# Patient Record
Sex: FEMALE | Race: WHITE | NOT HISPANIC OR LATINO | Employment: OTHER | URBAN - METROPOLITAN AREA
[De-identification: names, ages, dates, MRNs, and addresses within clinical notes are randomized per-mention and may not be internally consistent; named-entity substitution may affect disease eponyms.]

---

## 2017-12-14 ENCOUNTER — OUTSIDE FACILITY SERVICE (OUTPATIENT)
Dept: GASTROENTEROLOGY | Facility: CLINIC | Age: 52
End: 2017-12-14

## 2017-12-14 ENCOUNTER — LAB REQUISITION (OUTPATIENT)
Dept: LAB | Facility: HOSPITAL | Age: 52
End: 2017-12-14

## 2017-12-14 DIAGNOSIS — D12.5 BENIGN NEOPLASM OF SIGMOID COLON: ICD-10-CM

## 2017-12-14 PROCEDURE — 88305 TISSUE EXAM BY PATHOLOGIST: CPT | Performed by: INTERNAL MEDICINE

## 2017-12-14 PROCEDURE — 45385 COLONOSCOPY W/LESION REMOVAL: CPT | Performed by: INTERNAL MEDICINE

## 2017-12-14 PROCEDURE — G0500 MOD SEDAT ENDO SERVICE >5YRS: HCPCS | Performed by: INTERNAL MEDICINE

## 2017-12-15 LAB
CYTO UR: NORMAL
LAB AP CASE REPORT: NORMAL
LAB AP CLINICAL INFORMATION: NORMAL
Lab: NORMAL
PATH REPORT.FINAL DX SPEC: NORMAL
PATH REPORT.GROSS SPEC: NORMAL

## 2020-01-02 ENCOUNTER — TELEPHONE (OUTPATIENT)
Dept: INTERNAL MEDICINE | Facility: CLINIC | Age: 55
End: 2020-01-02

## 2020-01-02 NOTE — TELEPHONE ENCOUNTER
Pt says she was a long time pt and ended up getting  and had to go on medicaid.  Pt says office didn't take medicaid so she had to find care somewhere.  Now that office takes medicaid pt would like to see Dr. Escobedo again.  Pt says she would have never left if it wasn't for the insurance change.  Pt is requesting to be seen again by Dr. Escobedo and would like a call back if she can or not.

## 2020-01-06 PROBLEM — E66.9 OBESITY, CLASS I, BMI 30-34.9: Status: ACTIVE | Noted: 2020-01-06

## 2020-01-06 PROBLEM — E53.8 VITAMIN B12 DEFICIENCY (NON ANEMIC): Status: ACTIVE | Noted: 2020-01-06

## 2020-01-06 PROBLEM — M19.041 PRIMARY OSTEOARTHRITIS OF BOTH HANDS: Status: ACTIVE | Noted: 2020-01-06

## 2020-01-06 PROBLEM — M54.2 CHRONIC NECK PAIN: Status: ACTIVE | Noted: 2020-01-06

## 2020-01-06 PROBLEM — K21.00 GASTRO-ESOPHAGEAL REFLUX DISEASE WITH ESOPHAGITIS: Status: ACTIVE | Noted: 2020-01-06

## 2020-01-06 PROBLEM — M19.042 PRIMARY OSTEOARTHRITIS OF BOTH HANDS: Status: ACTIVE | Noted: 2020-01-06

## 2020-01-06 PROBLEM — F41.0 PANIC DISORDER WITHOUT AGORAPHOBIA: Status: ACTIVE | Noted: 2020-01-06

## 2020-01-06 PROBLEM — R73.09 ABNORMAL GLUCOSE: Status: ACTIVE | Noted: 2020-01-06

## 2020-01-06 PROBLEM — N95.9 MENOPAUSAL AND POSTMENOPAUSAL DISORDER: Status: ACTIVE | Noted: 2020-01-06

## 2020-01-06 PROBLEM — G89.29 CHRONIC NECK PAIN: Status: ACTIVE | Noted: 2020-01-06

## 2020-01-06 PROBLEM — E55.9 VITAMIN D DEFICIENCY: Status: ACTIVE | Noted: 2020-01-06

## 2020-01-06 PROBLEM — Z13.89 ENCOUNTER FOR SCREENING FOR OTHER DISORDER: Status: ACTIVE | Noted: 2020-01-06

## 2020-01-06 PROBLEM — Z86.39 HISTORY OF THYROTOXICOSIS: Status: ACTIVE | Noted: 2020-01-06

## 2020-01-06 PROBLEM — E78.00 HYPERCHOLESTEROLEMIA: Status: ACTIVE | Noted: 2020-01-06

## 2020-01-06 PROBLEM — E66.811 OBESITY, CLASS I, BMI 30-34.9: Status: ACTIVE | Noted: 2020-01-06

## 2020-01-06 PROBLEM — Z78.0 MENOPAUSE: Status: ACTIVE | Noted: 2020-01-06

## 2020-01-16 ENCOUNTER — OFFICE VISIT (OUTPATIENT)
Dept: INTERNAL MEDICINE | Facility: CLINIC | Age: 55
End: 2020-01-16

## 2020-01-16 ENCOUNTER — LAB (OUTPATIENT)
Dept: LAB | Facility: HOSPITAL | Age: 55
End: 2020-01-16

## 2020-01-16 VITALS
HEIGHT: 62 IN | DIASTOLIC BLOOD PRESSURE: 80 MMHG | HEART RATE: 76 BPM | BODY MASS INDEX: 36.07 KG/M2 | SYSTOLIC BLOOD PRESSURE: 126 MMHG | WEIGHT: 196 LBS

## 2020-01-16 DIAGNOSIS — R73.09 ABNORMAL GLUCOSE: ICD-10-CM

## 2020-01-16 DIAGNOSIS — E55.9 VITAMIN D DEFICIENCY: ICD-10-CM

## 2020-01-16 DIAGNOSIS — E78.00 HYPERCHOLESTEROLEMIA: ICD-10-CM

## 2020-01-16 DIAGNOSIS — K21.00 GASTRO-ESOPHAGEAL REFLUX DISEASE WITH ESOPHAGITIS: ICD-10-CM

## 2020-01-16 DIAGNOSIS — E53.8 VITAMIN B12 DEFICIENCY (NON ANEMIC): ICD-10-CM

## 2020-01-16 DIAGNOSIS — Z23 NEED FOR VACCINATION: ICD-10-CM

## 2020-01-16 DIAGNOSIS — E66.01 CLASS 2 SEVERE OBESITY DUE TO EXCESS CALORIES WITH SERIOUS COMORBIDITY AND BODY MASS INDEX (BMI) OF 36.0 TO 36.9 IN ADULT (HCC): Chronic | ICD-10-CM

## 2020-01-16 DIAGNOSIS — E78.00 HYPERCHOLESTEROLEMIA: Primary | ICD-10-CM

## 2020-01-16 DIAGNOSIS — L65.9 HAIR LOSS: Chronic | ICD-10-CM

## 2020-01-16 DIAGNOSIS — R10.31 RLQ ABDOMINAL PAIN: Chronic | ICD-10-CM

## 2020-01-16 PROBLEM — E66.812 CLASS 2 SEVERE OBESITY DUE TO EXCESS CALORIES WITH SERIOUS COMORBIDITY AND BODY MASS INDEX (BMI) OF 36.0 TO 36.9 IN ADULT: Chronic | Status: ACTIVE | Noted: 2020-01-06

## 2020-01-16 PROBLEM — F41.0 PANIC DISORDER WITHOUT AGORAPHOBIA: Status: RESOLVED | Noted: 2020-01-06 | Resolved: 2020-01-16

## 2020-01-16 LAB
BILIRUB UR QL STRIP: NEGATIVE
CLARITY UR: CLEAR
COLOR UR: YELLOW
GLUCOSE UR STRIP-MCNC: NEGATIVE MG/DL
HGB UR QL STRIP.AUTO: NEGATIVE
KETONES UR QL STRIP: NEGATIVE
LEUKOCYTE ESTERASE UR QL STRIP.AUTO: NEGATIVE
NITRITE UR QL STRIP: NEGATIVE
PH UR STRIP.AUTO: 6.5 [PH] (ref 5–8)
PROT UR QL STRIP: NEGATIVE
SP GR UR STRIP: 1.02 (ref 1–1.03)
UROBILINOGEN UR QL STRIP: NORMAL

## 2020-01-16 PROCEDURE — 83036 HEMOGLOBIN GLYCOSYLATED A1C: CPT

## 2020-01-16 PROCEDURE — 81003 URINALYSIS AUTO W/O SCOPE: CPT

## 2020-01-16 PROCEDURE — 36415 COLL VENOUS BLD VENIPUNCTURE: CPT

## 2020-01-16 PROCEDURE — 80061 LIPID PANEL: CPT

## 2020-01-16 PROCEDURE — 86376 MICROSOMAL ANTIBODY EACH: CPT

## 2020-01-16 PROCEDURE — 93000 ELECTROCARDIOGRAM COMPLETE: CPT | Performed by: INTERNAL MEDICINE

## 2020-01-16 PROCEDURE — 85025 COMPLETE CBC W/AUTO DIFF WBC: CPT

## 2020-01-16 PROCEDURE — 90472 IMMUNIZATION ADMIN EACH ADD: CPT | Performed by: INTERNAL MEDICINE

## 2020-01-16 PROCEDURE — 90674 CCIIV4 VAC NO PRSV 0.5 ML IM: CPT | Performed by: INTERNAL MEDICINE

## 2020-01-16 PROCEDURE — 84439 ASSAY OF FREE THYROXINE: CPT

## 2020-01-16 PROCEDURE — 90471 IMMUNIZATION ADMIN: CPT | Performed by: INTERNAL MEDICINE

## 2020-01-16 PROCEDURE — 99213 OFFICE O/P EST LOW 20 MIN: CPT | Performed by: INTERNAL MEDICINE

## 2020-01-16 PROCEDURE — 82306 VITAMIN D 25 HYDROXY: CPT

## 2020-01-16 PROCEDURE — 84443 ASSAY THYROID STIM HORMONE: CPT

## 2020-01-16 PROCEDURE — 90632 HEPA VACCINE ADULT IM: CPT | Performed by: INTERNAL MEDICINE

## 2020-01-16 PROCEDURE — 99396 PREV VISIT EST AGE 40-64: CPT | Performed by: INTERNAL MEDICINE

## 2020-01-16 PROCEDURE — 82607 VITAMIN B-12: CPT

## 2020-01-16 PROCEDURE — 80053 COMPREHEN METABOLIC PANEL: CPT

## 2020-01-16 RX ORDER — RANITIDINE 150 MG/1
150 TABLET ORAL NIGHTLY
Qty: 90 TABLET | Refills: 1 | Status: SHIPPED | OUTPATIENT
Start: 2020-01-16 | End: 2020-04-16

## 2020-01-16 RX ORDER — LACTOBACILLUS RHAMNOSUS GG 10B CELL
1 CAPSULE ORAL DAILY
COMMUNITY

## 2020-01-16 RX ORDER — ESOMEPRAZOLE MAGNESIUM 40 MG/1
40 CAPSULE, DELAYED RELEASE ORAL
Qty: 90 CAPSULE | Refills: 1 | Status: SHIPPED | OUTPATIENT
Start: 2020-01-16 | End: 2020-03-31

## 2020-01-16 NOTE — PATIENT INSTRUCTIONS
Exercising to Lose Weight  Exercise is structured, repetitive physical activity to improve fitness and health. Getting regular exercise is important for everyone. It is especially important if you are overweight. Being overweight increases your risk of heart disease, stroke, diabetes, high blood pressure, and several types of cancer. Reducing your calorie intake and exercising can help you lose weight.  Exercise is usually categorized as moderate or vigorous intensity. To lose weight, most people need to do a certain amount of moderate-intensity or vigorous-intensity exercise each week.  Moderate-intensity exercise    Moderate-intensity exercise is any activity that gets you moving enough to burn at least three times more energy (calories) than if you were sitting.  Examples of moderate exercise include:  · Walking a mile in 15 minutes.  · Doing light yard work.  · Biking at an easy pace.  Most people should get at least 150 minutes (2 hours and 30 minutes) a week of moderate-intensity exercise to maintain their body weight.  Vigorous-intensity exercise  Vigorous-intensity exercise is any activity that gets you moving enough to burn at least six times more calories than if you were sitting. When you exercise at this intensity, you should be working hard enough that you are not able to carry on a conversation.  Examples of vigorous exercise include:  · Running.  · Playing a team sport, such as football, basketball, and soccer.  · Jumping rope.  Most people should get at least 75 minutes (1 hour and 15 minutes) a week of vigorous-intensity exercise to maintain their body weight.  How can exercise affect me?  When you exercise enough to burn more calories than you eat, you lose weight. Exercise also reduces body fat and builds muscle. The more muscle you have, the more calories you burn. Exercise also:  · Improves mood.  · Reduces stress and tension.  · Improves your overall fitness, flexibility, and  endurance.  · Increases bone strength.  The amount of exercise you need to lose weight depends on:  · Your age.  · The type of exercise.  · Any health conditions you have.  · Your overall physical ability.  Talk to your health care provider about how much exercise you need and what types of activities are safe for you.  What actions can I take to lose weight?  Nutrition    · Make changes to your diet as told by your health care provider or diet and nutrition specialist (dietitian). This may include:  ? Eating fewer calories.  ? Eating more protein.  ? Eating less unhealthy fats.  ? Eating a diet that includes fresh fruits and vegetables, whole grains, low-fat dairy products, and lean protein.  ? Avoiding foods with added fat, salt, and sugar.  · Drink plenty of water while you exercise to prevent dehydration or heat stroke.  Activity  · Choose an activity that you enjoy and set realistic goals. Your health care provider can help you make an exercise plan that works for you.  · Exercise at a moderate or vigorous intensity most days of the week.  ? The intensity of exercise may vary from person to person. You can tell how intense a workout is for you by paying attention to your breathing and heartbeat. Most people will notice their breathing and heartbeat get faster with more intense exercise.  · Do resistance training twice each week, such as:  ? Push-ups.  ? Sit-ups.  ? Lifting weights.  ? Using resistance bands.  · Getting short amounts of exercise can be just as helpful as long structured periods of exercise. If you have trouble finding time to exercise, try to include exercise in your daily routine.  ? Get up, stretch, and walk around every 30 minutes throughout the day.  ? Go for a walk during your lunch break.  ? Park your car farther away from your destination.  ? If you take public transportation, get off one stop early and walk the rest of the way.  ? Make phone calls while standing up and walking  around.  ? Take the stairs instead of elevators or escalators.  · Wear comfortable clothes and shoes with good support.  · Do not exercise so much that you hurt yourself, feel dizzy, or get very short of breath.  Where to find more information  · [place] Department of Health and Human Services: www.hhs.gov  · Centers for Disease Control and Prevention (CDC): www.cdc.gov  Contact a health care provider:  · Before starting a new exercise program.  · If you have questions or concerns about your weight.  · If you have a medical problem that keeps you from exercising.  Get help right away if you have any of the following while exercising:  · Injury.  · Dizziness.  · Difficulty breathing or shortness of breath that does not go away when you stop exercising.  · Chest pain.  · Rapid heartbeat.  Summary  · Being overweight increases your risk of heart disease, stroke, diabetes, high blood pressure, and several types of cancer.  · Losing weight happens when you burn more calories than you eat.  · Reducing the amount of calories you eat in addition to getting regular moderate or vigorous exercise each week helps you lose weight.  This information is not intended to replace advice given to you by your health care provider. Make sure you discuss any questions you have with your health care provider.  Document Released: 01/20/2012 Document Revised: 12/31/2018 Document Reviewed: 12/31/2018  [employer] Interactive Patient Education © 2019 [employer]    Calorie Counting for Weight Loss  Calories are units of energy. Your body needs a certain amount of calories from food to keep you going throughout the day. When you eat more calories than your body needs, your body stores the extra calories as fat. When you eat fewer calories than your body needs, your body burns fat to get the energy it needs.  Calorie counting means keeping track of how many calories you eat and drink each day. Calorie counting can be helpful if you need to lose  weight. If you make sure to eat fewer calories than your body needs, you should lose weight. Ask your health care provider what a healthy weight is for you.  For calorie counting to work, you will need to eat the right number of calories in a day in order to lose a healthy amount of weight per week. A dietitian can help you determine how many calories you need in a day and will give you suggestions on how to reach your calorie goal.  · A healthy amount of weight to lose per week is usually 1-2 lb (0.5-0.9 kg). This usually means that your daily calorie intake should be reduced by 500-750 calories.  · Eating 1,200 - 1,500 calories per day can help most women lose weight.  · Eating 1,500 - 1,800 calories per day can help most men lose weight.  What is my plan?  My goal is to have __________ calories per day.  If I have this many calories per day, I should lose around __________ pounds per week.  What do I need to know about calorie counting?  In order to meet your daily calorie goal, you will need to:  · Find out how many calories are in each food you would like to eat. Try to do this before you eat.  · Decide how much of the food you plan to eat.  · Write down what you ate and how many calories it had. Doing this is called keeping a food log.  To successfully lose weight, it is important to balance calorie counting with a healthy lifestyle that includes regular activity. Aim for 150 minutes of moderate exercise (such as walking) or 75 minutes of vigorous exercise (such as running) each week.  Where do I find calorie information?    The number of calories in a food can be found on a Nutrition Facts label. If a food does not have a Nutrition Facts label, try to look up the calories online or ask your dietitian for help.  Remember that calories are listed per serving. If you choose to have more than one serving of a food, you will have to multiply the calories per serving by the amount of servings you plan to eat. For  "example, the label on a package of bread might say that a serving size is 1 slice and that there are 90 calories in a serving. If you eat 1 slice, you will have eaten 90 calories. If you eat 2 slices, you will have eaten 180 calories.  How do I keep a food log?  Immediately after each meal, record the following information in your food log:  · What you ate. Don't forget to include toppings, sauces, and other extras on the food.  · How much you ate. This can be measured in cups, ounces, or number of items.  · How many calories each food and drink had.  · The total number of calories in the meal.  Keep your food log near you, such as in a small notebook in your pocket, or use a mobile chela or website. Some programs will calculate calories for you and show you how many calories you have left for the day to meet your goal.  What are some calorie counting tips?    · Use your calories on foods and drinks that will fill you up and not leave you hungry:  ? Some examples of foods that fill you up are nuts and nut butters, vegetables, lean proteins, and high-fiber foods like whole grains. High-fiber foods are foods with more than 5 g fiber per serving.  ? Drinks such as sodas, specialty coffee drinks, alcohol, and juices have a lot of calories, yet do not fill you up.  · Eat nutritious foods and avoid empty calories. Empty calories are calories you get from foods or beverages that do not have many vitamins or protein, such as candy, sweets, and soda. It is better to have a nutritious high-calorie food (such as an avocado) than a food with few nutrients (such as a bag of chips).  · Know how many calories are in the foods you eat most often. This will help you calculate calorie counts faster.  · Pay attention to calories in drinks. Low-calorie drinks include water and unsweetened drinks.  · Pay attention to nutrition labels for \"low fat\" or \"fat free\" foods. These foods sometimes have the same amount of calories or more calories " than the full fat versions. They also often have added sugar, starch, or salt, to make up for flavor that was removed with the fat.  · Find a way of tracking calories that works for you. Get creative. Try different apps or programs if writing down calories does not work for you.  What are some portion control tips?  · Know how many calories are in a serving. This will help you know how many servings of a certain food you can have.  · Use a measuring cup to measure serving sizes. You could also try weighing out portions on a kitchen scale. With time, you will be able to estimate serving sizes for some foods.  · Take some time to put servings of different foods on your favorite plates, bowls, and cups so you know what a serving looks like.  · Try not to eat straight from a bag or box. Doing this can lead to overeating. Put the amount you would like to eat in a cup or on a plate to make sure you are eating the right portion.  · Use smaller plates, glasses, and bowls to prevent overeating.  · Try not to multitask (for example, watch TV or use your computer) while eating. If it is time to eat, sit down at a table and enjoy your food. This will help you to know when you are full. It will also help you to be aware of what you are eating and how much you are eating.  What are tips for following this plan?  Reading food labels  · Check the calorie count compared to the serving size. The serving size may be smaller than what you are used to eating.  · Check the source of the calories. Make sure the food you are eating is high in vitamins and protein and low in saturated and trans fats.  Shopping  · Read nutrition labels while you shop. This will help you make healthy decisions before you decide to purchase your food.  · Make a grocery list and stick to it.  Cooking  · Try to cook your favorite foods in a healthier way. For example, try baking instead of frying.  · Use low-fat dairy products.  Meal planning  · Use more fruits  "and vegetables. Half of your plate should be fruits and vegetables.  · Include lean proteins like poultry and fish.  How do I count calories when eating out?  · Ask for smaller portion sizes.  · Consider sharing an entree and sides instead of getting your own entree.  · If you get your own entree, eat only half. Ask for a box at the beginning of your meal and put the rest of your entree in it so you are not tempted to eat it.  · If calories are listed on the menu, choose the lower calorie options.  · Choose dishes that include vegetables, fruits, whole grains, low-fat dairy products, and lean protein.  · Choose items that are boiled, broiled, grilled, or steamed. Stay away from items that are buttered, battered, fried, or served with cream sauce. Items labeled \"crispy\" are usually fried, unless stated otherwise.  · Choose water, low-fat milk, unsweetened iced tea, or other drinks without added sugar. If you want an alcoholic beverage, choose a lower calorie option such as a glass of wine or light beer.  · Ask for dressings, sauces, and syrups on the side. These are usually high in calories, so you should limit the amount you eat.  · If you want a salad, choose a garden salad and ask for grilled meats. Avoid extra toppings like bajwa, cheese, or fried items. Ask for the dressing on the side, or ask for olive oil and vinegar or lemon to use as dressing.  · Estimate how many servings of a food you are given. For example, a serving of cooked rice is ½ cup or about the size of half a baseball. Knowing serving sizes will help you be aware of how much food you are eating at restaurants. The list below tells you how big or small some common portion sizes are based on everyday objects:  ? 1 oz--4 stacked dice.  ? 3 oz--1 deck of cards.  ? 1 tsp--1 die.  ? 1 Tbsp--½ a ping-pong ball.  ? 2 Tbsp--1 ping-pong ball.  ? ½ cup--½ baseball.  ? 1 cup--1 baseball.  Summary  · Calorie counting means keeping track of how many calories " you eat and drink each day. If you eat fewer calories than your body needs, you should lose weight.  · A healthy amount of weight to lose per week is usually 1-2 lb (0.5-0.9 kg). This usually means reducing your daily calorie intake by 500-750 calories.  · The number of calories in a food can be found on a Nutrition Facts label. If a food does not have a Nutrition Facts label, try to look up the calories online or ask your dietitian for help.  · Use your calories on foods and drinks that will fill you up, and not on foods and drinks that will leave you hungry.  · Use smaller plates, glasses, and bowls to prevent overeating.  This information is not intended to replace advice given to you by your health care provider. Make sure you discuss any questions you have with your health care provider.  Document Released: 12/18/2006 Document Revised: 09/06/2019 Document Reviewed: 11/17/2017  [employer] Interactive Patient Education © 2019 [employer]

## 2020-01-16 NOTE — PROGRESS NOTES
QUICK REFERENCE INFORMATION:  The ABCs of the Annual Wellness Visit    Annual Wellness visit    HEALTH RISK ASSESSMENT    1965    Recent History  No hospitalization(s) within the last year..        Current Medical Providers:  Patient Care Team:  Elyse Escobedo MD as PCP - General (Internal Medicine)        Smoking Status:  Social History     Tobacco Use   Smoking Status Never Smoker   Smokeless Tobacco Never Used   Tobacco Comment    no passive smoke exposure       Alcohol Consumption:  Social History     Substance and Sexual Activity   Alcohol Use No       Depression Screen:   PHQ-2/PHQ-9 Depression Screening 1/16/2020   Little interest or pleasure in doing things 0   Feeling down, depressed, or hopeless 0   Total Score 0       Health Habits:              Recent Lab Results:  CMP:  Lab Results   Component Value Date    BUN 21 12/21/2016    CREATININE 0.90 12/21/2016    EGFRIFNONA 66 12/21/2016    BCR 23.3 12/21/2016     12/21/2016    K 3.8 12/21/2016    CO2 24.0 12/21/2016    CALCIUM 9.9 12/21/2016    ALBUMIN 5.00 (H) 12/21/2016    BILITOT 1.0 12/21/2016    ALKPHOS 141 (H) 12/21/2016    AST 19 12/21/2016    ALT 24 12/21/2016     Lipid Panel:     HbA1c:           Age-appropriate Screening Schedule:  Refer to the list below for future screening recommendations based on patient's age, sex and/or medical conditions. Orders for these recommended tests are listed in the plan section. The patient has been provided with a written plan.    Age appropriate preventive counseling done including age appropriate vaccines,regular  Mammogram and self breast exam, pap smear, colonoscopy, regular dental visits, mental health, injury prevention such as wearing seat belt and preventing falls, healthy  nutrition, healthy weight, regular physical exercise. Alcohol use is moderate.  Tobacco history-none. Drug use-none.  STD's-not at risk.          Health Maintenance   Topic Date Due   • MAMMOGRAM  1965   • ZOSTER  VACCINE (1 of 2) 09/16/2016   • INFLUENZA VACCINE  08/01/2019   • LIPID PANEL  01/16/2020   • COLONOSCOPY  12/20/2022   • TDAP/TD VACCINES (2 - Td) 05/15/2023        Subjective   History of Present Illness    Carla Camarillo is a 54 y.o. female who presents for an Annual Wellness Visit and RLQ abdominal pain and chronic conditions including GERD, hypercholesterolemia..    HPI  RLQ abdominal pain about 2 months. No nausea or fever or chills. Was constipated at the time when went to ED in [place]. Denies constipation now.They indicated she might have some ?stricture of the colon per patient. ED doc told her to start a probiotic and benefiber.  Last colonoscopy was 12/2017 by Dr. Pandya and showed 1 polyp and some diverticulosis.    HPI  Hair loss over last few months. In the part area. Has lost hair in the past when stressed. No itching or irritation of scalp. She does admit a lot of stress.    CHRONIC CONDITIONS    She has not been exercising.  She realizes she needs to improve her diet.    She has not been taking B12 or vitamin D.    GERD is ongoing. Nexium helps. Ranitidine in the past at night helped. Stopped it when the over-the-counter ranitidine was recalled.    The following portions of the patient's history were reviewed and updated as appropriate: allergies, current medications, past family history, past medical history, past social history, past surgical history and problem list.    Outpatient Medications Prior to Visit   Medication Sig Dispense Refill   • probiotic (CULTURELLE) capsule capsule Take 1 capsule by mouth Daily.     • esomeprazole (nexIUM) 40 MG capsule Take 40 mg by mouth Every Morning Before Breakfast.     • ondansetron ODT (ZOFRAN-ODT) 4 MG disintegrating tablet Take 1 tablet by mouth Every 6 (Six) Hours As Needed for nausea or vomiting for up to 10 doses. 10 tablet 0   • raNITIdine (ZANTAC) 150 MG tablet Take 150 mg by mouth 2 (Two) Times a Day.       No facility-administered medications  prior to visit.        Patient Active Problem List   Diagnosis   • Menopausal and postmenopausal disorder   • Encounter for screening for other disorder   • Vitamin D deficiency   • Class 2 severe obesity due to excess calories with serious comorbidity and body mass index (BMI) of 36.0 to 36.9 in adult (CMS/HCC)   • Abnormal glucose   • Menopause   • Hypercholesterolemia   • Primary osteoarthritis of both hands   • Gastro-esophageal reflux disease with esophagitis   • History of thyrotoxicosis   • Vitamin B12 deficiency (non anemic)   • Chronic neck pain   • RLQ abdominal pain   • Hair loss       Advance Care Planning:  Patient has an advance directive - a copy has not been provided. Have asked the patient to send this to us to add to record    Identification of Risk Factors:  Risk factors include: Cardiovascular risk  Obesity/Overweight .    Review of Systems   Constitutional: Negative for chills, fatigue and fever.   HENT: Negative for congestion, ear pain, hearing loss and sinus pressure.    Eyes: Negative for visual disturbance.   Respiratory: Negative for cough, chest tightness, shortness of breath and wheezing.    Cardiovascular: Negative for chest pain, palpitations and leg swelling.   Gastrointestinal: Positive for abdominal pain. Negative for blood in stool, constipation, diarrhea and nausea.   Endocrine: Negative for cold intolerance and heat intolerance.   Genitourinary: Negative for dysuria and frequency.   Musculoskeletal: Negative for arthralgias, back pain and gait problem.   Skin: Negative for color change and rash.   Allergic/Immunologic: Negative for environmental allergies.   Neurological: Negative for dizziness, speech difficulty and headaches.   Hematological: Negative for adenopathy. Does not bruise/bleed easily.   Psychiatric/Behavioral: Negative for confusion, dysphoric mood, sleep disturbance and suicidal ideas. The patient is not nervous/anxious.        Compared to one year ago, the patient  feels her physical health is the same.  Compared to one year ago, the patient feels her mental health is the same.    Objective     Physical Exam   Constitutional: She is oriented to person, place, and time. She appears well-developed and well-nourished.   Morbid obesity   HENT:   Head: Normocephalic.   Eyes: Pupils are equal, round, and reactive to light. Conjunctivae and EOM are normal.   Neck: Normal range of motion. Neck supple. No thyromegaly present.   Cardiovascular: Normal rate, regular rhythm, normal heart sounds and intact distal pulses.   Pulmonary/Chest: Effort normal and breath sounds normal. She has no wheezes. Right breast exhibits no inverted nipple, no mass, no nipple discharge, no skin change and no tenderness. Left breast exhibits no inverted nipple, no mass, no nipple discharge, no skin change and no tenderness.   Abdominal: Soft. Bowel sounds are normal. There is no hepatosplenomegaly. There is no tenderness. No hernia.   Musculoskeletal: Normal range of motion. She exhibits no edema or tenderness.   Lymphadenopathy:     She has no cervical adenopathy.     She has no axillary adenopathy.   Neurological: She is alert and oriented to person, place, and time. She has normal strength. No cranial nerve deficit or sensory deficit. Coordination and gait normal.   Skin: Skin is warm and dry. No rash noted.   Psychiatric: She has a normal mood and affect. Her speech is normal and behavior is normal. Judgment and thought content normal. Cognition and memory are normal.   Nursing note and vitals reviewed.          ECG 12 Lead  Date/Time: 1/16/2020 2:13 PM  Performed by: Elyse Escobedo MD  Authorized by: Elyse Escobedo MD   Comparison: compared with previous ECG   Similar to previous ECG  Rhythm: sinus rhythm  Rate: normal  BPM: 76  Conduction: conduction normal  ST Segments: ST segments normal  T Waves: T waves normal  QRS axis: normal    Clinical impression: normal ECG            Vitals:     "01/16/20 1159   BP: 126/80   BP Location: Left arm   Patient Position: Sitting   Cuff Size: Adult   Pulse: 76   Weight: 88.9 kg (196 lb)   Height: 156.2 cm (61.5\")   PainSc:   2   PainLoc: Abdomen       Patient's Body mass index is 36.43 kg/m². BMI is above normal parameters. Recommendations include: educational material, exercise counseling and nutrition counseling.      CMP:  Lab Results   Component Value Date    BUN 21 12/21/2016    CREATININE 0.90 12/21/2016    EGFRIFNONA 66 12/21/2016    BCR 23.3 12/21/2016     12/21/2016    K 3.8 12/21/2016    CO2 24.0 12/21/2016    CALCIUM 9.9 12/21/2016    ALBUMIN 5.00 (H) 12/21/2016    BILITOT 1.0 12/21/2016    ALKPHOS 141 (H) 12/21/2016    AST 19 12/21/2016    ALT 24 12/21/2016     HbA1c:  No results found for: HGBA1C  Microalbumin:  No results found for: MICROALBUR, POCMALB, POCCREAT  Lipid Panel  Lab Results   Component Value Date    AST 19 12/21/2016    ALT 24 12/21/2016       Assessment/Plan   Patient Self-Management and Personalized Health Advice  The patient has been provided with information about: diet, exercise and weight management and preventive services including:   · Annual Wellness Visit (AWV).  Patient Instructions   Problem List Items Addressed This Visit        Cardiovascular and Mediastinum    Hypercholesterolemia - Primary    Overview     1/16/2020 Elyse Escobedo MD    Decrease fats and sugars in the diet.  Increase exercise and physical activity.  Work on weight loss.         Relevant Orders    Comprehensive Metabolic Panel    CBC & Differential    Lipid Panel    Urinalysis without microscopic (no culture) - Urine, Clean Catch    TSH    T4, Free    Thyroid Peroxidase Antibody       Digestive    Class 2 severe obesity due to excess calories with serious comorbidity and body mass index (BMI) of 36.0 to 36.9 in adult (CMS/HCC) (Chronic)    Overview     1/16/2020 Elyse Escobedo MD    Increase exercise and physical activity.  Sign up for exercise " classes, walk, use hand weights at home.    Make better food choices.  Avoid snacking.  Avoid sodas.  Eat smaller portions at mealtime.    Weigh once a week on Monday mornings to monitor progress.  Try to lose about 4 pounds a month.         Vitamin D deficiency    Overview     1/16/2020 Elyse Escobedo MD    Check blood level today.         Relevant Orders    Vitamin D 25 Hydroxy    Gastro-esophageal reflux disease with esophagitis    Overview     1/16/2020 Elyse Escobedo MD    Continue Nexium daily and resume ranitidine in the evening.  Continue to avoid eating close to bedtime and avoid large meals.  Continue sleeping with the head of the bed elevated.  Weight loss often helps.         Relevant Medications    raNITIdine (ZANTAC) 150 MG tablet    esomeprazole (nexIUM) 40 MG capsule    Vitamin B12 deficiency (non anemic)    Overview     1/16/2020 Elyse Escobedo MD    Check blood level today.         Relevant Orders    Vitamin B12       Nervous and Auditory    RLQ abdominal pain (Chronic)    Overview     1/16/2020 Elyse Escobedo MD    Continue taking the probiotic and the Benefiber daily.  Drink plenty of fluids.  Eat smaller portions at mealtime.  Work on weight loss.    We will obtain the ER records and CT scan report from K from [facility] in [place].            Musculoskeletal and Integument    Hair loss (Chronic)    Overview     1/16/2020 Elyse Escobedo MD    May take a vitamin for hair and nails.  Continue working on stress reduction.  We will check labs today.            Other    Abnormal glucose    Overview     1/16/2020 Elyse Escobedo MD    Continue to decrease sugars and white carbohydrates in the diet.  Increase exercise and physical activity.  Work on weight loss.         Relevant Orders    Hemoglobin A1c      Other Visit Diagnoses     Need for vaccination        Relevant Orders    Hepatitis A Vaccine Adult IM (Completed)             Diagnosis Plan   1. Hypercholesterolemia   Comprehensive Metabolic Panel    CBC & Differential    Lipid Panel    Urinalysis without microscopic (no culture) - Urine, Clean Catch    TSH    T4, Free    Thyroid Peroxidase Antibody   2. Gastro-esophageal reflux disease with esophagitis     3. Vitamin D deficiency  Vitamin D 25 Hydroxy   4. Vitamin B12 deficiency (non anemic)  Vitamin B12   5. Class 2 severe obesity due to excess calories with serious comorbidity and body mass index (BMI) of 36.0 to 36.9 in adult (CMS/[facility])     6. Abnormal glucose  Hemoglobin A1c   7. Need for vaccination  Hepatitis A Vaccine Adult IM   8. RLQ abdominal pain     9. Hair loss         Patient Instructions   Exercising to Lose Weight  Exercise is structured, repetitive physical activity to improve fitness and health. Getting regular exercise is important for everyone. It is especially important if you are overweight. Being overweight increases your risk of heart disease, stroke, diabetes, high blood pressure, and several types of cancer. Reducing your calorie intake and exercising can help you lose weight.  Exercise is usually categorized as moderate or vigorous intensity. To lose weight, most people need to do a certain amount of moderate-intensity or vigorous-intensity exercise each week.  Moderate-intensity exercise    Moderate-intensity exercise is any activity that gets you moving enough to burn at least three times more energy (calories) than if you were sitting.  Examples of moderate exercise include:  · Walking a mile in 15 minutes.  · Doing light yard work.  · Biking at an easy pace.  Most people should get at least 150 minutes (2 hours and 30 minutes) a week of moderate-intensity exercise to maintain their body weight.  Vigorous-intensity exercise  Vigorous-intensity exercise is any activity that gets you moving enough to burn at least six times more calories than if you were sitting. When you exercise at this intensity, you should be working hard enough that you are not  able to carry on a conversation.  Examples of vigorous exercise include:  · Running.  · Playing a team sport, such as football, basketball, and soccer.  · Jumping rope.  Most people should get at least 75 minutes (1 hour and 15 minutes) a week of vigorous-intensity exercise to maintain their body weight.  How can exercise affect me?  When you exercise enough to burn more calories than you eat, you lose weight. Exercise also reduces body fat and builds muscle. The more muscle you have, the more calories you burn. Exercise also:  · Improves mood.  · Reduces stress and tension.  · Improves your overall fitness, flexibility, and endurance.  · Increases bone strength.  The amount of exercise you need to lose weight depends on:  · Your age.  · The type of exercise.  · Any health conditions you have.  · Your overall physical ability.  Talk to your health care provider about how much exercise you need and what types of activities are safe for you.  What actions can I take to lose weight?  Nutrition    · Make changes to your diet as told by your health care provider or diet and nutrition specialist (dietitian). This may include:  ? Eating fewer calories.  ? Eating more protein.  ? Eating less unhealthy fats.  ? Eating a diet that includes fresh fruits and vegetables, whole grains, low-fat dairy products, and lean protein.  ? Avoiding foods with added fat, salt, and sugar.  · Drink plenty of water while you exercise to prevent dehydration or heat stroke.  Activity  · Choose an activity that you enjoy and set realistic goals. Your health care provider can help you make an exercise plan that works for you.  · Exercise at a moderate or vigorous intensity most days of the week.  ? The intensity of exercise may vary from person to person. You can tell how intense a workout is for you by paying attention to your breathing and heartbeat. Most people will notice their breathing and heartbeat get faster with more intense  exercise.  · Do resistance training twice each week, such as:  ? Push-ups.  ? Sit-ups.  ? Lifting weights.  ? Using resistance bands.  · Getting short amounts of exercise can be just as helpful as long structured periods of exercise. If you have trouble finding time to exercise, try to include exercise in your daily routine.  ? Get up, stretch, and walk around every 30 minutes throughout the day.  ? Go for a walk during your lunch break.  ? Park your car farther away from your destination.  ? If you take public transportation, get off one stop early and walk the rest of the way.  ? Make phone calls while standing up and walking around.  ? Take the stairs instead of elevators or escalators.  · Wear comfortable clothes and shoes with good support.  · Do not exercise so much that you hurt yourself, feel dizzy, or get very short of breath.  Where to find more information  · [place] Department of Health and Human Services: www.hhs.gov  · Centers for Disease Control and Prevention (CDC): www.cdc.gov  Contact a health care provider:  · Before starting a new exercise program.  · If you have questions or concerns about your weight.  · If you have a medical problem that keeps you from exercising.  Get help right away if you have any of the following while exercising:  · Injury.  · Dizziness.  · Difficulty breathing or shortness of breath that does not go away when you stop exercising.  · Chest pain.  · Rapid heartbeat.  Summary  · Being overweight increases your risk of heart disease, stroke, diabetes, high blood pressure, and several types of cancer.  · Losing weight happens when you burn more calories than you eat.  · Reducing the amount of calories you eat in addition to getting regular moderate or vigorous exercise each week helps you lose weight.  This information is not intended to replace advice given to you by your health care provider. Make sure you discuss any questions you have with your health care  provider.  Document Released: 01/20/2012 Document Revised: 12/31/2018 Document Reviewed: 12/31/2018  [employer] Interactive Patient Education © 2019 [employer]    Calorie Counting for Weight Loss  Calories are units of energy. Your body needs a certain amount of calories from food to keep you going throughout the day. When you eat more calories than your body needs, your body stores the extra calories as fat. When you eat fewer calories than your body needs, your body burns fat to get the energy it needs.  Calorie counting means keeping track of how many calories you eat and drink each day. Calorie counting can be helpful if you need to lose weight. If you make sure to eat fewer calories than your body needs, you should lose weight. Ask your health care provider what a healthy weight is for you.  For calorie counting to work, you will need to eat the right number of calories in a day in order to lose a healthy amount of weight per week. A dietitian can help you determine how many calories you need in a day and will give you suggestions on how to reach your calorie goal.  · A healthy amount of weight to lose per week is usually 1-2 lb (0.5-0.9 kg). This usually means that your daily calorie intake should be reduced by 500-750 calories.  · Eating 1,200 - 1,500 calories per day can help most women lose weight.  · Eating 1,500 - 1,800 calories per day can help most men lose weight.  What is my plan?  My goal is to have __________ calories per day.  If I have this many calories per day, I should lose around __________ pounds per week.  What do I need to know about calorie counting?  In order to meet your daily calorie goal, you will need to:  · Find out how many calories are in each food you would like to eat. Try to do this before you eat.  · Decide how much of the food you plan to eat.  · Write down what you ate and how many calories it had. Doing this is called keeping a food log.  To successfully lose weight, it is  important to balance calorie counting with a healthy lifestyle that includes regular activity. Aim for 150 minutes of moderate exercise (such as walking) or 75 minutes of vigorous exercise (such as running) each week.  Where do I find calorie information?    The number of calories in a food can be found on a Nutrition Facts label. If a food does not have a Nutrition Facts label, try to look up the calories online or ask your dietitian for help.  Remember that calories are listed per serving. If you choose to have more than one serving of a food, you will have to multiply the calories per serving by the amount of servings you plan to eat. For example, the label on a package of bread might say that a serving size is 1 slice and that there are 90 calories in a serving. If you eat 1 slice, you will have eaten 90 calories. If you eat 2 slices, you will have eaten 180 calories.  How do I keep a food log?  Immediately after each meal, record the following information in your food log:  · What you ate. Don't forget to include toppings, sauces, and other extras on the food.  · How much you ate. This can be measured in cups, ounces, or number of items.  · How many calories each food and drink had.  · The total number of calories in the meal.  Keep your food log near you, such as in a small notebook in your pocket, or use a mobile chela or website. Some programs will calculate calories for you and show you how many calories you have left for the day to meet your goal.  What are some calorie counting tips?    · Use your calories on foods and drinks that will fill you up and not leave you hungry:  ? Some examples of foods that fill you up are nuts and nut butters, vegetables, lean proteins, and high-fiber foods like whole grains. High-fiber foods are foods with more than 5 g fiber per serving.  ? Drinks such as sodas, specialty coffee drinks, alcohol, and juices have a lot of calories, yet do not fill you up.  · Eat nutritious  "foods and avoid empty calories. Empty calories are calories you get from foods or beverages that do not have many vitamins or protein, such as candy, sweets, and soda. It is better to have a nutritious high-calorie food (such as an avocado) than a food with few nutrients (such as a bag of chips).  · Know how many calories are in the foods you eat most often. This will help you calculate calorie counts faster.  · Pay attention to calories in drinks. Low-calorie drinks include water and unsweetened drinks.  · Pay attention to nutrition labels for \"low fat\" or \"fat free\" foods. These foods sometimes have the same amount of calories or more calories than the full fat versions. They also often have added sugar, starch, or salt, to make up for flavor that was removed with the fat.  · Find a way of tracking calories that works for you. Get creative. Try different apps or programs if writing down calories does not work for you.  What are some portion control tips?  · Know how many calories are in a serving. This will help you know how many servings of a certain food you can have.  · Use a measuring cup to measure serving sizes. You could also try weighing out portions on a kitchen scale. With time, you will be able to estimate serving sizes for some foods.  · Take some time to put servings of different foods on your favorite plates, bowls, and cups so you know what a serving looks like.  · Try not to eat straight from a bag or box. Doing this can lead to overeating. Put the amount you would like to eat in a cup or on a plate to make sure you are eating the right portion.  · Use smaller plates, glasses, and bowls to prevent overeating.  · Try not to multitask (for example, watch TV or use your computer) while eating. If it is time to eat, sit down at a table and enjoy your food. This will help you to know when you are full. It will also help you to be aware of what you are eating and how much you are eating.  What are tips " "for following this plan?  Reading food labels  · Check the calorie count compared to the serving size. The serving size may be smaller than what you are used to eating.  · Check the source of the calories. Make sure the food you are eating is high in vitamins and protein and low in saturated and trans fats.  Shopping  · Read nutrition labels while you shop. This will help you make healthy decisions before you decide to purchase your food.  · Make a grocery list and stick to it.  Cooking  · Try to cook your favorite foods in a healthier way. For example, try baking instead of frying.  · Use low-fat dairy products.  Meal planning  · Use more fruits and vegetables. Half of your plate should be fruits and vegetables.  · Include lean proteins like poultry and fish.  How do I count calories when eating out?  · Ask for smaller portion sizes.  · Consider sharing an entree and sides instead of getting your own entree.  · If you get your own entree, eat only half. Ask for a box at the beginning of your meal and put the rest of your entree in it so you are not tempted to eat it.  · If calories are listed on the menu, choose the lower calorie options.  · Choose dishes that include vegetables, fruits, whole grains, low-fat dairy products, and lean protein.  · Choose items that are boiled, broiled, grilled, or steamed. Stay away from items that are buttered, battered, fried, or served with cream sauce. Items labeled \"crispy\" are usually fried, unless stated otherwise.  · Choose water, low-fat milk, unsweetened iced tea, or other drinks without added sugar. If you want an alcoholic beverage, choose a lower calorie option such as a glass of wine or light beer.  · Ask for dressings, sauces, and syrups on the side. These are usually high in calories, so you should limit the amount you eat.  · If you want a salad, choose a garden salad and ask for grilled meats. Avoid extra toppings like bajwa, cheese, or fried items. Ask for the " dressing on the side, or ask for olive oil and vinegar or lemon to use as dressing.  · Estimate how many servings of a food you are given. For example, a serving of cooked rice is ½ cup or about the size of half a baseball. Knowing serving sizes will help you be aware of how much food you are eating at restaurants. The list below tells you how big or small some common portion sizes are based on everyday objects:  ? 1 oz--4 stacked dice.  ? 3 oz--1 deck of cards.  ? 1 tsp--1 die.  ? 1 Tbsp--½ a ping-pong ball.  ? 2 Tbsp--1 ping-pong ball.  ? ½ cup--½ baseball.  ? 1 cup--1 baseball.  Summary  · Calorie counting means keeping track of how many calories you eat and drink each day. If you eat fewer calories than your body needs, you should lose weight.  · A healthy amount of weight to lose per week is usually 1-2 lb (0.5-0.9 kg). This usually means reducing your daily calorie intake by 500-750 calories.  · The number of calories in a food can be found on a Nutrition Facts label. If a food does not have a Nutrition Facts label, try to look up the calories online or ask your dietitian for help.  · Use your calories on foods and drinks that will fill you up, and not on foods and drinks that will leave you hungry.  · Use smaller plates, glasses, and bowls to prevent overeating.  This information is not intended to replace advice given to you by your health care provider. Make sure you discuss any questions you have with your health care provider.  Document Released: 12/18/2006 Document Revised: 09/06/2019 Document Reviewed: 11/17/2017  [employer] Patient Education © 2019 [employer]        Outpatient Encounter Medications as of 1/16/2020   Medication Sig Dispense Refill   • esomeprazole (nexIUM) 40 MG capsule Take 1 capsule by mouth Every Morning Before Breakfast. 90 capsule 1   • probiotic (CULTURELLE) capsule capsule Take 1 capsule by mouth Daily.     • [DISCONTINUED] esomeprazole (nexIUM) 40 MG capsule Take 40  mg by mouth Every Morning Before Breakfast.     • raNITIdine (ZANTAC) 150 MG tablet Take 1 tablet by mouth Every Night. 90 tablet 1   • [DISCONTINUED] ondansetron ODT (ZOFRAN-ODT) 4 MG disintegrating tablet Take 1 tablet by mouth Every 6 (Six) Hours As Needed for nausea or vomiting for up to 10 doses. 10 tablet 0   • [DISCONTINUED] raNITIdine (ZANTAC) 150 MG tablet Take 150 mg by mouth 2 (Two) Times a Day.       No facility-administered encounter medications on file as of 1/16/2020.        Reviewed use of high risk medication in the elderly: not applicable  Reviewed for potential of harmful drug interactions in the elderly: not applicable    Follow Up:  Return in about 3 months (around 4/16/2020) for Recheck.     An After Visit Summary and PPPS with all of these plans were given to the patient.           Note: Part of this note may be an electronic transcription/translation of spoken language to printed text using the Dragon Dictation System.

## 2020-01-17 LAB
25(OH)D3 SERPL-MCNC: 30.9 NG/ML (ref 30–100)
ALBUMIN SERPL-MCNC: 4.5 G/DL (ref 3.5–5.2)
ALBUMIN/GLOB SERPL: 1.6 G/DL
ALP SERPL-CCNC: 101 U/L (ref 39–117)
ALT SERPL W P-5'-P-CCNC: 29 U/L (ref 1–33)
ANION GAP SERPL CALCULATED.3IONS-SCNC: 13.4 MMOL/L (ref 5–15)
AST SERPL-CCNC: 24 U/L (ref 1–32)
BASOPHILS # BLD AUTO: 0.05 10*3/MM3 (ref 0–0.2)
BASOPHILS NFR BLD AUTO: 0.7 % (ref 0–1.5)
BILIRUB SERPL-MCNC: 0.5 MG/DL (ref 0.2–1.2)
BUN BLD-MCNC: 12 MG/DL (ref 6–20)
BUN/CREAT SERPL: 17.9 (ref 7–25)
CALCIUM SPEC-SCNC: 8.9 MG/DL (ref 8.6–10.5)
CHLORIDE SERPL-SCNC: 103 MMOL/L (ref 98–107)
CHOLEST SERPL-MCNC: 167 MG/DL (ref 0–200)
CO2 SERPL-SCNC: 23.6 MMOL/L (ref 22–29)
CREAT BLD-MCNC: 0.67 MG/DL (ref 0.57–1)
DEPRECATED RDW RBC AUTO: 41.9 FL (ref 37–54)
EOSINOPHIL # BLD AUTO: 0.26 10*3/MM3 (ref 0–0.4)
EOSINOPHIL NFR BLD AUTO: 3.4 % (ref 0.3–6.2)
ERYTHROCYTE [DISTWIDTH] IN BLOOD BY AUTOMATED COUNT: 13 % (ref 12.3–15.4)
GFR SERPL CREATININE-BSD FRML MDRD: 92 ML/MIN/1.73
GLOBULIN UR ELPH-MCNC: 2.9 GM/DL
GLUCOSE BLD-MCNC: 90 MG/DL (ref 65–99)
HBA1C MFR BLD: 5.4 % (ref 4.8–5.6)
HCT VFR BLD AUTO: 41.3 % (ref 34–46.6)
HDLC SERPL-MCNC: 50 MG/DL (ref 40–60)
HGB BLD-MCNC: 13.6 G/DL (ref 12–15.9)
IMM GRANULOCYTES # BLD AUTO: 0.01 10*3/MM3 (ref 0–0.05)
IMM GRANULOCYTES NFR BLD AUTO: 0.1 % (ref 0–0.5)
LDLC SERPL CALC-MCNC: 98 MG/DL (ref 0–100)
LDLC/HDLC SERPL: 1.95 {RATIO}
LYMPHOCYTES # BLD AUTO: 2.81 10*3/MM3 (ref 0.7–3.1)
LYMPHOCYTES NFR BLD AUTO: 36.9 % (ref 19.6–45.3)
MCH RBC QN AUTO: 29.3 PG (ref 26.6–33)
MCHC RBC AUTO-ENTMCNC: 32.9 G/DL (ref 31.5–35.7)
MCV RBC AUTO: 89 FL (ref 79–97)
MONOCYTES # BLD AUTO: 0.42 10*3/MM3 (ref 0.1–0.9)
MONOCYTES NFR BLD AUTO: 5.5 % (ref 5–12)
NEUTROPHILS # BLD AUTO: 4.06 10*3/MM3 (ref 1.7–7)
NEUTROPHILS NFR BLD AUTO: 53.4 % (ref 42.7–76)
NRBC BLD AUTO-RTO: 0 /100 WBC (ref 0–0.2)
PLATELET # BLD AUTO: 295 10*3/MM3 (ref 140–450)
PMV BLD AUTO: 10.1 FL (ref 6–12)
POTASSIUM BLD-SCNC: 3.8 MMOL/L (ref 3.5–5.2)
PROT SERPL-MCNC: 7.4 G/DL (ref 6–8.5)
RBC # BLD AUTO: 4.64 10*6/MM3 (ref 3.77–5.28)
SODIUM BLD-SCNC: 140 MMOL/L (ref 136–145)
T4 FREE SERPL-MCNC: 1.32 NG/DL (ref 0.93–1.7)
TRIGL SERPL-MCNC: 97 MG/DL (ref 0–150)
TSH SERPL DL<=0.05 MIU/L-ACNC: 1.65 UIU/ML (ref 0.27–4.2)
VIT B12 BLD-MCNC: 590 PG/ML (ref 211–946)
VLDLC SERPL-MCNC: 19.4 MG/DL (ref 5–40)
WBC NRBC COR # BLD: 7.61 10*3/MM3 (ref 3.4–10.8)

## 2020-01-18 LAB — THYROPEROXIDASE AB SERPL-ACNC: 10 IU/ML (ref 0–34)

## 2020-01-22 NOTE — PROGRESS NOTES
Sent fax request to [facility] in [place]  #[phone] for copy of recent ER visit, labs and CT of abdomen attn Dr. Escobedo

## 2020-01-22 NOTE — PROGRESS NOTES
Sent fax request to [facility] fax# [phone] to obtain recent mammogram report.  Send report to our fax attn. Dr. Escobedo.

## 2020-01-23 DIAGNOSIS — E55.9 VITAMIN D DEFICIENCY: Primary | ICD-10-CM

## 2020-01-23 RX ORDER — MULTIVIT-MIN/IRON/FOLIC ACID/K 18-600-40
1 CAPSULE ORAL DAILY
Qty: 60 TABLET | Refills: 5
Start: 2020-01-23 | End: 2020-04-16 | Stop reason: SDUPTHER

## 2020-02-19 ENCOUNTER — TELEPHONE (OUTPATIENT)
Dept: INTERNAL MEDICINE | Facility: CLINIC | Age: 55
End: 2020-02-19

## 2020-03-31 ENCOUNTER — TELEPHONE (OUTPATIENT)
Dept: INTERNAL MEDICINE | Facility: CLINIC | Age: 55
End: 2020-03-31

## 2020-03-31 RX ORDER — ESOMEPRAZOLE MAGNESIUM 20 MG/1
1 TABLET, DELAYED RELEASE ORAL DAILY
Qty: 30 TABLET | Refills: 5 | Status: SHIPPED | OUTPATIENT
Start: 2020-03-31 | End: 2020-04-16 | Stop reason: SDUPTHER

## 2020-03-31 NOTE — TELEPHONE ENCOUNTER
Let patient know we received a communication from her insurance that use omeprazole is no longer covered.  I chose Nexium 24-hour tablet from their list of preferred alternatives and sent that to her pharmacy.

## 2020-04-16 ENCOUNTER — OFFICE VISIT (OUTPATIENT)
Dept: INTERNAL MEDICINE | Facility: CLINIC | Age: 55
End: 2020-04-16

## 2020-04-16 DIAGNOSIS — K65.4 MESENTERIC PANNICULITIS (HCC): ICD-10-CM

## 2020-04-16 DIAGNOSIS — B00.1 FEVER BLISTER: Chronic | ICD-10-CM

## 2020-04-16 DIAGNOSIS — K21.00 GASTRO-ESOPHAGEAL REFLUX DISEASE WITH ESOPHAGITIS: ICD-10-CM

## 2020-04-16 DIAGNOSIS — M19.042 PRIMARY OSTEOARTHRITIS OF BOTH HANDS: ICD-10-CM

## 2020-04-16 DIAGNOSIS — R10.11 RUQ ABDOMINAL PAIN: Chronic | ICD-10-CM

## 2020-04-16 DIAGNOSIS — M19.041 PRIMARY OSTEOARTHRITIS OF BOTH HANDS: ICD-10-CM

## 2020-04-16 DIAGNOSIS — E55.9 VITAMIN D DEFICIENCY: ICD-10-CM

## 2020-04-16 DIAGNOSIS — K76.0 NONALCOHOLIC FATTY LIVER DISEASE: Chronic | ICD-10-CM

## 2020-04-16 DIAGNOSIS — E78.00 HYPERCHOLESTEROLEMIA: Primary | ICD-10-CM

## 2020-04-16 DIAGNOSIS — E66.01 CLASS 2 SEVERE OBESITY DUE TO EXCESS CALORIES WITH SERIOUS COMORBIDITY AND BODY MASS INDEX (BMI) OF 36.0 TO 36.9 IN ADULT (HCC): Chronic | ICD-10-CM

## 2020-04-16 PROCEDURE — 99443 PR PHYS/QHP TELEPHONE EVALUATION 21-30 MIN: CPT | Performed by: INTERNAL MEDICINE

## 2020-04-16 RX ORDER — VALACYCLOVIR HYDROCHLORIDE 1 G/1
1000 TABLET, FILM COATED ORAL 2 TIMES DAILY PRN
Qty: 30 TABLET | Refills: 5 | Status: SHIPPED | OUTPATIENT
Start: 2020-04-16 | End: 2020-11-13

## 2020-04-16 RX ORDER — VALACYCLOVIR HYDROCHLORIDE 1 G/1
1000 TABLET, FILM COATED ORAL DAILY
COMMUNITY

## 2020-04-16 RX ORDER — ESOMEPRAZOLE MAGNESIUM 20 MG/1
1 TABLET, DELAYED RELEASE ORAL DAILY
Qty: 30 TABLET | Refills: 5
Start: 2020-04-16 | End: 2020-07-24 | Stop reason: SDUPTHER

## 2020-04-16 RX ORDER — MULTIVIT-MIN/IRON/FOLIC ACID/K 18-600-40
2000 CAPSULE ORAL DAILY
Start: 2020-04-16

## 2020-04-16 RX ORDER — LANOLIN ALCOHOL/MO/W.PET/CERES
1000 CREAM (GRAM) TOPICAL DAILY
COMMUNITY

## 2020-04-16 RX ORDER — ESOMEPRAZOLE MAGNESIUM 40 MG/1
40 CAPSULE, DELAYED RELEASE ORAL
COMMUNITY
End: 2020-04-16

## 2020-04-16 NOTE — PROGRESS NOTES
[facility]     Carla Camarillo  1965   [id number]      Patient Care Team:  Elyse Escobedo MD as PCP - General (Internal Medicine)    You have chosen to receive care through a telephone visit. Do you consent to use a telephone visit for your medical care today? Yes    Chief Complaint   Patient presents with   • Abdominal Pain     Right sided pain that you all had discussed at the last visit    • Imaging Only     Patient would like to discuss results from ER back in January             HPI  Patient is a 54 y.o. female presents with R upper abdominal pain. Onset of symptoms was gradual starting several months ago.  Chronicity chronic. Severity  Mild to moderate, 6/10 when it is at it's worst.  Symptoms are associated with worse when she was constipated, occasionally mildly tender in right upper abdomen when it flares up. Pertinent negatives no F/chills, nausea,diarrhea, blood in the stool.   Symptoms are aggravated by constipation possibly.   Symptoms improve with using benefiber and probiotic daily helps some.  Context went to ED in October.   Quality dull ache.      Had CT abdomen in ER in [place] 10/2019.  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  No other significant findings.    CHRONIC CONDITIONS    GERD symptoms have been doing pretty well lately with 40 mg use omeprazole.  She has not picked up the 20 mg tablets yet.  Her pharmacy coverage does not cover the 40 mg tablets.she does find sleeping with head of bed elevated and avoiding eating close to bedtime helps a lot.    Gets fever blisters whenever stressed. Had 2 over last month.  Valacyclovir is the only thing that has helped her in the past.    Not exercising or walking much.  Tries to eat low fat most of the time but not paying a lot of attention to diet lately.    Bilateral hand arthritis bothers her. No soft tissue swelling or redness.  She has not tried any arthritis creams.    Past Medical History:   Diagnosis Date    • Self's esophagus 2014    repeat EGD 2 y, PPI    • GERD (gastroesophageal reflux disease)    • Graves disease 11/2010    Dr Simmons-methimazole    • Hiatal hernia    • Panic disorder without agoraphobia 1/6/2020   • Ptosis, left eyelid 2011    etiology uncertain; saw neurologist and opthalmologist   • Sleep apnea    • Thyrotoxicosis 08/02/2011    w/o mention of goiter or other   • Tibial plateau fracture, right 10/2018   • Uterine prolapse        Past Surgical History:   Procedure Laterality Date   • CHOLECYSTECTOMY  1997   • ENDOSCOPY     • HYSTERECTOMY  2010    JAHAIRA/BSO       Family History   Problem Relation Age of Onset   • Osteoarthritis Mother    • Hypertension Mother    • Stroke Mother 76   • Hypertension Father    • Coronary artery disease Father 58   • Fibrocystic breast disease Sister    • Cancer Maternal Aunt 64        breast   • Alcohol abuse Paternal Grandfather    • Other Paternal Grandfather         smoking   • Cancer Paternal Grandfather         throat       Social History     Socioeconomic History   • Marital status:      Spouse name: Not on file   • Number of children: Not on file   • Years of education: Not on file   • Highest education level: Not on file   Tobacco Use   • Smoking status: Never Smoker   • Smokeless tobacco: Never Used   • Tobacco comment: no passive smoke exposure   Substance and Sexual Activity   • Alcohol use: No   • Drug use: No       Allergies   Allergen Reactions   • Hydrocodone Bitartrate Er Other (See Comments)     GI upset, Lortab   • Morphine And Related Other (See Comments)     Decreased BP       Review of Systems:     Review of Systems   Constitutional: Negative for chills, fatigue and fever.   HENT: Negative for congestion, ear pain and sinus pressure.    Respiratory: Negative for cough, chest tightness, shortness of breath and wheezing.    Cardiovascular: Negative for chest pain, palpitations and leg swelling.   Gastrointestinal: Positive for abdominal  pain and GERD. Negative for blood in stool, constipation, diarrhea and nausea.   Genitourinary: Negative for dysuria, flank pain and frequency.   Skin: Negative for color change.   Allergic/Immunologic: Negative for environmental allergies.   Neurological: Negative for dizziness, speech difficulty and headache.   Psychiatric/Behavioral: Positive for stress. Negative for decreased concentration and depressed mood. The patient is not nervous/anxious.        Vital Signs  There were no vitals filed for this visit.  There is no height or weight on file to calculate BMI.      Current Outpatient Medications:   •  Nutritional Supplements (ADULT NUTRITIONAL SUPPLEMENT + PO), Monolaurin 600mg: Take 1 capsule by mouth once daily, Disp: , Rfl:   •  Omega-3 Fatty Acids (OMEGA-3 FISH OIL PO), Take 1 capsule by mouth Daily., Disp: , Rfl:   •  probiotic (CULTURELLE) capsule capsule, Take 1 capsule by mouth Daily., Disp: , Rfl:   •  valACYclovir (VALTREX) 1000 MG tablet, Take 1,000 mg by mouth Daily., Disp: , Rfl:   •  vitamin B-12 (CYANOCOBALAMIN) 1000 MCG tablet, Take 1,000 mcg by mouth Daily., Disp: , Rfl:   •  Vitamin D, Cholecalciferol, 25 MCG (1000 UT) tablet, Take 2,000 Units by mouth Daily., Disp: , Rfl:   •  Esomeprazole Magnesium (nexIUM 24HR) 20 MG tablet delayed-release, Take 1 tablet by mouth Daily., Disp: 30 tablet, Rfl: 5  •  valACYclovir (VALTREX) 1000 MG tablet, Take 1 tablet by mouth 2 (Two) Times a Day As Needed (fever blister)., Disp: 30 tablet, Rfl: 5    Physical Exam:    Physical Exam   Constitutional: She is oriented to person, place, and time. She appears well-developed and well-nourished. No distress. She is morbidly obese.  Pulmonary/Chest: Effort normal.   Neurological: She is alert and oriented to person, place, and time.   Psychiatric: She has a normal mood and affect. Her behavior is normal. Judgment and thought content normal.        ACE III MINI        Results Review:    None    CMP:  Lab Results    Component Value Date    BUN 12 01/16/2020    CREATININE 0.67 01/16/2020    EGFRIFNONA 92 01/16/2020    BCR 17.9 01/16/2020     01/16/2020    K 3.8 01/16/2020    CO2 23.6 01/16/2020    CALCIUM 8.9 01/16/2020    ALBUMIN 4.50 01/16/2020    BILITOT 0.5 01/16/2020    ALKPHOS 101 01/16/2020    AST 24 01/16/2020    ALT 29 01/16/2020     HbA1c:  Lab Results   Component Value Date    HGBA1C 5.40 01/16/2020     Microalbumin:  No results found for: MICROALBUR, POCMALB, POCCREAT  Lipid Panel  Lab Results   Component Value Date    CHOL 167 01/16/2020    TRIG 97 01/16/2020    HDL 50 01/16/2020    LDL 98 01/16/2020    AST 24 01/16/2020    ALT 29 01/16/2020       Medication Review: Medications reviewed and noted  Patient Instructions   Problem List Items Addressed This Visit        Cardiovascular and Mediastinum    Hypercholesterolemia - Primary    Overview     4/16/2020 Elyse Escobedo MD    Decrease fats and sugars in the diet.  Increase exercise and physical activity.  Try to walk some every day.  Work on weight loss.            Digestive    Class 2 severe obesity due to excess calories with serious comorbidity and body mass index (BMI) of 36.0 to 36.9 in adult (CMS/HCC) (Chronic)    Overview     4/16/2020 Elyse Escobedo MD    Increase exercise and physical activity.  Try to walk some every day, use hand weights at home.    Make better food choices.  Avoid snacking.  Avoid sodas.  Eat smaller portions at mealtime.    Weigh once a week on Monday mornings to monitor progress.  Try to lose about 4 pounds a month.         Nonalcoholic fatty liver disease (Chronic)    Overview     4/16/2020 Elyse Escobedo MD    Patient advised to eat less fatty foods and less sugars and work on weight loss.         Vitamin D deficiency    Overview     4/16/2020 Elyse Escobedo MD    Continue 2000 units vitamin D3 daily.         Relevant Medications    Vitamin D, Cholecalciferol, 25 MCG (1000 UT) tablet    Gastro-esophageal reflux  disease with esophagitis    Overview     4/16/2020 Elyse Escobedo MD    Continue Nexium daily.  Continue to avoid eating close to bedtime and avoid large meals.  Continue sleeping with the head of the bed elevated.  Weight loss often helps.         Relevant Medications    Esomeprazole Magnesium (nexIUM 24HR) 20 MG tablet delayed-release    Mesenteric panniculitis (CMS/HCC)    Overview     4/16/2020 Elyse Escobedo MD    Patient advised to work on weight loss.  She was also advised to try a moist heat pack on the area when the pain flares up.            Nervous and Auditory    RUQ abdominal pain (Chronic)    Overview     4/16/2020 Elyse Escobedo MD    Continue taking the probiotic and the Benefiber daily.  Drink plenty of fluids.  Eat smaller portions at mealtime.  Work on weight loss. Try a moist heat pack as needed.    ER records and CT scan report from [facility] in [place] revealed fatty liver and mesenteric panniculitis.            Musculoskeletal and Integument    Primary osteoarthritis of both hands    Overview     4/16/2020 Elyse Escobedo MD    Keep the hands warm. Try an otc arthritis cream like Biofreeze as needed.            Other    Fever blister (Chronic)    Overview     4/16/2020 Elyse Escobedo MD    Use valacyclovir tablet 2 times a day as needed.         Relevant Medications    valACYclovir (VALTREX) 1000 MG tablet    valACYclovir (VALTREX) 1000 MG tablet             Diagnosis Plan   1. Hypercholesterolemia     2. RUQ abdominal pain     3. Nonalcoholic fatty liver disease     4. Mesenteric panniculitis (CMS/HCC)     5. Class 2 severe obesity due to excess calories with serious comorbidity and body mass index (BMI) of 36.0 to 36.9 in adult (CMS/HCC)     6. Vitamin D deficiency  Vitamin D, Cholecalciferol, 25 MCG (1000 UT) tablet   7. Gastro-esophageal reflux disease with esophagitis     8. Primary osteoarthritis of both hands     9. Fever blister         Plan of care  reviewed with patient at the conclusion of today's visit. Education was provided regarding diagnosis, management, and any prescribed or recommended OTC medications.Patient verbalizes understanding of and agreement with management plan.      This visit has been rescheduled as a phone visit to comply with patient safety concerns in accordance with CDC recommendations. Total time of discussion was 34 minutes.        Elyse Escobedo MD

## 2020-04-16 NOTE — PATIENT INSTRUCTIONS
Patient Instructions   Problem List Items Addressed This Visit        Cardiovascular and Mediastinum    Hypercholesterolemia - Primary    Overview     4/16/2020 Elyse Escobedo MD    Decrease fats and sugars in the diet.  Increase exercise and physical activity.  Try to walk some every day.  Work on weight loss.            Digestive    Class 2 severe obesity due to excess calories with serious comorbidity and body mass index (BMI) of 36.0 to 36.9 in adult (CMS/HCC) (Chronic)    Overview     4/16/2020 Elyse Escobedo MD    Increase exercise and physical activity.  Try to walk some every day, use hand weights at home.    Make better food choices.  Avoid snacking.  Avoid sodas.  Eat smaller portions at mealtime.    Weigh once a week on Monday mornings to monitor progress.  Try to lose about 4 pounds a month.         Nonalcoholic fatty liver disease (Chronic)    Overview     4/16/2020 Elyse Escobedo MD    Patient advised to eat less fatty foods and less sugars and work on weight loss.         Vitamin D deficiency    Overview     4/16/2020 Elyse Escobedo MD    Continue 2000 units vitamin D3 daily.         Relevant Medications    Vitamin D, Cholecalciferol, 25 MCG (1000 UT) tablet    Gastro-esophageal reflux disease with esophagitis    Overview     4/16/2020 Elyse Escobedo MD    Continue Nexium daily.  Continue to avoid eating close to bedtime and avoid large meals.  Continue sleeping with the head of the bed elevated.  Weight loss often helps.         Relevant Medications    Esomeprazole Magnesium (nexIUM 24HR) 20 MG tablet delayed-release    Mesenteric panniculitis (CMS/HCC)    Overview     4/16/2020 Elyse Escobedo MD    Patient advised to work on weight loss.  She was also advised to try a moist heat pack on the area when the pain flares up.            Nervous and Auditory    RUQ abdominal pain (Chronic)    Overview     4/16/2020 Elyse Escobedo MD    Continue taking the probiotic and the Benefiber  daily.  Drink plenty of fluids.  Eat smaller portions at mealtime.  Work on weight loss. Try a moist heat pack as needed.    ER records and CT scan report from [facility] in [place] revealed fatty liver and mesenteric panniculitis.            Musculoskeletal and Integument    Primary osteoarthritis of both hands    Overview     4/16/2020 Elyse Escobedo MD    Keep the hands warm. Try an otc arthritis cream like Biofreeze as needed.            Other    Fever blister (Chronic)    Overview     4/16/2020 Elyse Escobedo MD    Use valacyclovir tablet 2 times a day as needed.         Relevant Medications    valACYclovir (VALTREX) 1000 MG tablet    valACYclovir (VALTREX) 1000 MG tablet

## 2020-05-21 ENCOUNTER — PRIOR AUTHORIZATION (OUTPATIENT)
Dept: INTERNAL MEDICINE | Facility: CLINIC | Age: 55
End: 2020-05-21

## 2020-07-24 ENCOUNTER — PATIENT MESSAGE (OUTPATIENT)
Dept: INTERNAL MEDICINE | Facility: CLINIC | Age: 55
End: 2020-07-24

## 2020-07-24 RX ORDER — ESOMEPRAZOLE MAGNESIUM 20 MG/1
1 TABLET, DELAYED RELEASE ORAL DAILY
Qty: 90 TABLET | Refills: 1 | Status: SHIPPED | OUTPATIENT
Start: 2020-07-24 | End: 2020-07-29

## 2020-07-29 RX ORDER — ESOMEPRAZOLE MAGNESIUM 40 MG/1
40 CAPSULE, DELAYED RELEASE ORAL
Qty: 90 CAPSULE | Refills: 1 | Status: SHIPPED | OUTPATIENT
Start: 2020-07-29 | End: 2021-02-10

## 2020-11-13 RX ORDER — VALACYCLOVIR HYDROCHLORIDE 1 G/1
1000 TABLET, FILM COATED ORAL 2 TIMES DAILY PRN
Qty: 30 TABLET | Refills: 5 | Status: SHIPPED | OUTPATIENT
Start: 2020-11-13 | End: 2022-06-24

## 2021-02-10 RX ORDER — ESOMEPRAZOLE MAGNESIUM 40 MG/1
CAPSULE, DELAYED RELEASE ORAL
Qty: 90 CAPSULE | Refills: 1 | Status: SHIPPED | OUTPATIENT
Start: 2021-02-10 | End: 2021-08-09

## 2021-04-13 ENCOUNTER — LAB (OUTPATIENT)
Dept: LAB | Facility: HOSPITAL | Age: 56
End: 2021-04-13

## 2021-04-13 ENCOUNTER — HOSPITAL ENCOUNTER (OUTPATIENT)
Dept: GENERAL RADIOLOGY | Facility: HOSPITAL | Age: 56
Discharge: HOME | End: 2021-04-13

## 2021-04-13 ENCOUNTER — OFFICE VISIT (OUTPATIENT)
Dept: INTERNAL MEDICINE | Facility: CLINIC | Age: 56
End: 2021-04-13

## 2021-04-13 VITALS
WEIGHT: 201.3 LBS | BODY MASS INDEX: 37.04 KG/M2 | TEMPERATURE: 97.1 F | HEART RATE: 88 BPM | HEIGHT: 62 IN | DIASTOLIC BLOOD PRESSURE: 84 MMHG | SYSTOLIC BLOOD PRESSURE: 132 MMHG

## 2021-04-13 DIAGNOSIS — R73.09 ABNORMAL GLUCOSE: ICD-10-CM

## 2021-04-13 DIAGNOSIS — K76.0 NONALCOHOLIC FATTY LIVER DISEASE: Chronic | ICD-10-CM

## 2021-04-13 DIAGNOSIS — E55.9 VITAMIN D DEFICIENCY: ICD-10-CM

## 2021-04-13 DIAGNOSIS — E53.8 VITAMIN B12 DEFICIENCY (NON ANEMIC): ICD-10-CM

## 2021-04-13 DIAGNOSIS — M25.472 PAIN AND SWELLING OF ANKLE, LEFT: ICD-10-CM

## 2021-04-13 DIAGNOSIS — E78.00 HYPERCHOLESTEROLEMIA: ICD-10-CM

## 2021-04-13 DIAGNOSIS — E66.01 CLASS 2 SEVERE OBESITY DUE TO EXCESS CALORIES WITH SERIOUS COMORBIDITY AND BODY MASS INDEX (BMI) OF 36.0 TO 36.9 IN ADULT (HCC): Chronic | ICD-10-CM

## 2021-04-13 DIAGNOSIS — H61.22 IMPACTED CERUMEN OF LEFT EAR: Chronic | ICD-10-CM

## 2021-04-13 DIAGNOSIS — Z00.00 ANNUAL PHYSICAL EXAM: Primary | ICD-10-CM

## 2021-04-13 DIAGNOSIS — M25.572 PAIN AND SWELLING OF ANKLE, LEFT: ICD-10-CM

## 2021-04-13 DIAGNOSIS — K21.00 GASTROESOPHAGEAL REFLUX DISEASE WITH ESOPHAGITIS WITHOUT HEMORRHAGE: ICD-10-CM

## 2021-04-13 PROBLEM — K65.4 MESENTERIC PANNICULITIS (HCC): Status: RESOLVED | Noted: 2020-04-16 | Resolved: 2021-04-13

## 2021-04-13 PROBLEM — R10.11 RUQ ABDOMINAL PAIN: Chronic | Status: RESOLVED | Noted: 2020-01-16 | Resolved: 2021-04-13

## 2021-04-13 LAB
BASOPHILS # BLD AUTO: 0.04 10*3/MM3 (ref 0–0.2)
BASOPHILS NFR BLD AUTO: 0.5 % (ref 0–1.5)
BILIRUB UR QL STRIP: NEGATIVE
CLARITY UR: CLEAR
COLOR UR: YELLOW
DEPRECATED RDW RBC AUTO: 43.2 FL (ref 37–54)
EOSINOPHIL # BLD AUTO: 0.33 10*3/MM3 (ref 0–0.4)
EOSINOPHIL NFR BLD AUTO: 4.3 % (ref 0.3–6.2)
ERYTHROCYTE [DISTWIDTH] IN BLOOD BY AUTOMATED COUNT: 13.5 % (ref 12.3–15.4)
GLUCOSE UR STRIP-MCNC: NEGATIVE MG/DL
HBA1C MFR BLD: 5.8 % (ref 4.8–5.6)
HCT VFR BLD AUTO: 43.5 % (ref 34–46.6)
HGB BLD-MCNC: 14.1 G/DL (ref 12–15.9)
HGB UR QL STRIP.AUTO: NEGATIVE
IMM GRANULOCYTES # BLD AUTO: 0.02 10*3/MM3 (ref 0–0.05)
IMM GRANULOCYTES NFR BLD AUTO: 0.3 % (ref 0–0.5)
KETONES UR QL STRIP: NEGATIVE
LEUKOCYTE ESTERASE UR QL STRIP.AUTO: NEGATIVE
LYMPHOCYTES # BLD AUTO: 2.76 10*3/MM3 (ref 0.7–3.1)
LYMPHOCYTES NFR BLD AUTO: 35.8 % (ref 19.6–45.3)
MCH RBC QN AUTO: 28.8 PG (ref 26.6–33)
MCHC RBC AUTO-ENTMCNC: 32.4 G/DL (ref 31.5–35.7)
MCV RBC AUTO: 88.8 FL (ref 79–97)
MONOCYTES # BLD AUTO: 0.42 10*3/MM3 (ref 0.1–0.9)
MONOCYTES NFR BLD AUTO: 5.5 % (ref 5–12)
NEUTROPHILS NFR BLD AUTO: 4.13 10*3/MM3 (ref 1.7–7)
NEUTROPHILS NFR BLD AUTO: 53.6 % (ref 42.7–76)
NITRITE UR QL STRIP: NEGATIVE
NRBC BLD AUTO-RTO: 0 /100 WBC (ref 0–0.2)
PH UR STRIP.AUTO: 5.5 [PH] (ref 5–8)
PLATELET # BLD AUTO: 287 10*3/MM3 (ref 140–450)
PMV BLD AUTO: 10.1 FL (ref 6–12)
PROT UR QL STRIP: NEGATIVE
RBC # BLD AUTO: 4.9 10*6/MM3 (ref 3.77–5.28)
SP GR UR STRIP: 1.02 (ref 1–1.03)
UROBILINOGEN UR QL STRIP: NORMAL
WBC # BLD AUTO: 7.7 10*3/MM3 (ref 3.4–10.8)

## 2021-04-13 PROCEDURE — 82172 ASSAY OF APOLIPOPROTEIN: CPT

## 2021-04-13 PROCEDURE — 99396 PREV VISIT EST AGE 40-64: CPT | Performed by: INTERNAL MEDICINE

## 2021-04-13 PROCEDURE — 81003 URINALYSIS AUTO W/O SCOPE: CPT

## 2021-04-13 PROCEDURE — 84450 TRANSFERASE (AST) (SGOT): CPT

## 2021-04-13 PROCEDURE — 82465 ASSAY BLD/SERUM CHOLESTEROL: CPT

## 2021-04-13 PROCEDURE — 84443 ASSAY THYROID STIM HORMONE: CPT

## 2021-04-13 PROCEDURE — 80061 LIPID PANEL: CPT

## 2021-04-13 PROCEDURE — 84478 ASSAY OF TRIGLYCERIDES: CPT

## 2021-04-13 PROCEDURE — 83010 ASSAY OF HAPTOGLOBIN QUANT: CPT

## 2021-04-13 PROCEDURE — 82247 BILIRUBIN TOTAL: CPT

## 2021-04-13 PROCEDURE — 82306 VITAMIN D 25 HYDROXY: CPT

## 2021-04-13 PROCEDURE — 36415 COLL VENOUS BLD VENIPUNCTURE: CPT

## 2021-04-13 PROCEDURE — 73610 X-RAY EXAM OF ANKLE: CPT

## 2021-04-13 PROCEDURE — 82947 ASSAY GLUCOSE BLOOD QUANT: CPT

## 2021-04-13 PROCEDURE — 82570 ASSAY OF URINE CREATININE: CPT

## 2021-04-13 PROCEDURE — 82043 UR ALBUMIN QUANTITATIVE: CPT

## 2021-04-13 PROCEDURE — 86803 HEPATITIS C AB TEST: CPT

## 2021-04-13 PROCEDURE — 85025 COMPLETE CBC W/AUTO DIFF WBC: CPT

## 2021-04-13 PROCEDURE — 83036 HEMOGLOBIN GLYCOSYLATED A1C: CPT

## 2021-04-13 PROCEDURE — 82607 VITAMIN B-12: CPT

## 2021-04-13 PROCEDURE — 80053 COMPREHEN METABOLIC PANEL: CPT

## 2021-04-13 PROCEDURE — 84460 ALANINE AMINO (ALT) (SGPT): CPT

## 2021-04-13 PROCEDURE — 82977 ASSAY OF GGT: CPT

## 2021-04-13 PROCEDURE — 83883 ASSAY NEPHELOMETRY NOT SPEC: CPT

## 2021-04-13 NOTE — PATIENT INSTRUCTIONS
Patient Instructions   Problem List Items Addressed This Visit        Cardiac and Vasculature    Hypercholesterolemia    Overview     4/13/2021 Elyse Escobedo MD    Decrease fats and sugars in the diet.  Increase exercise and physical activity.  Try to walk some every day.  Work on weight loss.         Relevant Orders    CBC & Differential    Lipid Panel    TSH    Microalbumin / Creatinine Urine Ratio - Urine, Clean Catch    Urinalysis without microscopic (no culture) - Urine, Clean Catch       ENT    Impacted cerumen of left ear (Chronic)    Overview     4/13/2021 Elyse Escobedo MD                Endocrine and Metabolic    Class 2 severe obesity due to excess calories with serious comorbidity and body mass index (BMI) of 36.0 to 36.9 in adult (CMS/HCC) (Chronic)    Overview     4/13/2021 Elyse Escobedo MD    Increase exercise and physical activity.  Try to walk some every day, use hand weights at home.    Make better food choices.  Avoid snacking and fried foods and sugars.  Avoid sodas.  Eat smaller portions at mealtime.    Weigh once a week on Monday mornings to monitor progress.  Try to lose about 4 pounds a month.         Vitamin D deficiency    Overview     4/13/2021 Elyse Escobedo MD    Continue 2000 units vitamin D3 daily.         Relevant Medications    Vitamin D, Cholecalciferol, 25 MCG (1000 UT) tablet    Other Relevant Orders    Vitamin D 25 Hydroxy    Abnormal glucose    Overview     4/13/2021 Elyse Escobedo MD    Continue to decrease sugars and white carbohydrates in the diet.  Increase exercise and physical activity.  Work on weight loss to prevent progression to diabetes.         Relevant Orders    Hemoglobin A1c    Vitamin B12 deficiency (non anemic)    Overview     4/13/2021 Elyse Escobedo MD    Check blood level today.         Relevant Orders    Vitamin B12       Gastrointestinal Abdominal     Nonalcoholic fatty liver disease (Chronic)    Overview     4/13/2021 Elyse Escobedo  MD    Patient advised to eat less fatty foods and less sugars and work on weight loss.    Check FibroSure test today to determine degree of fibrosis in the liver.    Patient declines ultrasound with elastography at this time.         Relevant Orders    GUPTA Fibrosure    Comprehensive Metabolic Panel    Hepatitis C Antibody    Gastro-esophageal reflux disease with esophagitis    Overview     4/13/2021 Elyse Escobedo MD    Continue Nexium daily.  Continue to avoid eating close to bedtime and avoid large meals.  Continue sleeping with the head of the bed elevated.  Weight loss often helps.         Relevant Medications    esomeprazole (nexIUM) 40 MG capsule       Health Encounters    Annual physical exam - Primary    Overview     Patient is up-to-date on vaccinations except for the new shingles vaccine which she may get at her pharmacy.  She also needs to get the second hepatitis A vaccine.    She promises to call and schedule her mammogram.            Musculoskeletal and Injuries    Pain and swelling of ankle, left    Overview     4/13/2021 Elyse Escobedo MD    X-ray today to rule out stress fracture.    Elevate the leg with a cold pack around the ankle.  Wrap it with an Ace wrap.  Always wear good supportive shoes.  Do not go barefoot.         Relevant Orders    XR Ankle 3+ View Right             Nonalcoholic Fatty Liver Disease Diet, Adult  Nonalcoholic fatty liver disease is a condition that causes fat to build up in and around the liver. The disease makes it harder for the liver to work the way that it should. Following a healthy diet can help to keep nonalcoholic fatty liver disease under control. It can also help to prevent or improve conditions that are associated with the disease, such as heart disease, diabetes, high blood pressure, and abnormal cholesterol levels.  Along with regular exercise, this diet:  · Promotes weight loss.  · Helps to control blood sugar levels.  · Helps to improve the way that  the body uses insulin.  What are tips for following this plan?  Reading food labels  Always check food labels for:  · The amount of saturated fat in a food. You should limit your intake of saturated fat. Saturated fat is found in foods that come from animals, including meat and dairy products such as butter, cheese, and whole milk.  · The amount of fiber in a food. You should choose high-fiber foods such as fruits, vegetables, and whole grains. Try to get 25-30 grams (g) of fiber a day.    Cooking  · When cooking, use heart-healthy oils that are high in monounsaturated fats. These include olive oil, canola oil, and avocado oil.  · Limit frying or deep-frying foods. Cook foods using healthy methods such as baking, boiling, steaming, and grilling instead.  Meal planning  · You may want to keep track of how many calories you take in. Eating the right amount of calories will help you achieve a healthy weight. Meeting with a registered dietitian can help you get started.  · Limit how often you eat takeout and fast food. These foods are usually very high in fat, salt, and sugar.  · Use the glycemic index (GI) to plan your meals. The index tells you how quickly a food will raise your blood sugar. Choose low-GI foods (GI less than 55). These foods take a longer time to raise blood sugar. A registered dietitian can help you identify foods lower on the GI scale.  Lifestyle  · You may want to follow a Mediterranean diet. This diet includes a lot of vegetables, lean meats or fish, whole grains, fruits, and healthy oils and fats.  What foods can I eat?    Fruits  Bananas. Apples. Oranges. Grapes. Papaya. [place]. Pomegranate. Kiwi. Grapefruit. Cherries.  Vegetables  Lettuce. Spinach. Peas. Beets. Cauliflower. Cabbage. Broccoli. Carrots. Tomatoes. Squash. Eggplant. Herbs. Peppers. Onions. Cucumbers. [place] sprouts. Yams and sweet potatoes. Beans. Lentils.  Grains  Whole wheat or whole-grain foods, including breads, crackers,  cereals, and pasta. Stone-ground whole wheat. Unsweetened oatmeal. Bulgur. Barley. Quinoa. Brown or wild rice. Corn or whole wheat flour tortillas.  Meats and other proteins  Lean meats. Poultry. Tofu. Seafood and shellfish.  Dairy  Low-fat or fat-free dairy products, such as yogurt, cottage cheese, or cheese.  Beverages  Water. Sugar-free drinks. Tea. Coffee. Low-fat or skim milk. Milk alternatives, such as soy or almond milk. Real fruit juice.  Fats and oils  Avocado. Canola or olive oil. Nuts and nut butters. Seeds.  Seasonings and condiments  Mustard. Relish. Low-fat, low-sugar ketchup and barbecue sauce. Low-fat or fat-free mayonnaise.  Sweets and desserts  Sugar-free sweets.  The items listed above may not be a complete list of foods and beverages you can eat. Contact a dietitian for more information.  What foods should I limit or avoid?  Meats and other proteins  Limit red meat to 1-2 times a week.  Dairy  Full-fat dairy.  Fats and oils  Palm oil and coconut oil. Fried foods.  Other foods  Processed foods. Foods that contain a lot of salt or sodium.  Sweets and desserts  Sweets that contain sugar.  Beverages  Sweetened drinks, such as sweet tea, milkshakes, iced sweet drinks, and sodas. Alcohol.  The items listed above may not be a complete list of foods and beverages you should avoid. Contact a dietitian for more information.  Where to find more information  The National [place] of Diabetes and Digestive and Kidney Diseases: niddk.nih.gov  Summary  · Nonalcoholic fatty liver disease is a condition that causes fat to build up in and around the liver.  · Following a healthy diet can help to keep nonalcoholic fatty liver disease under control. Your diet should be rich in fruits, vegetables, whole grains, and lean proteins.  · Limit your intake of saturated fat. Saturated fat is found in foods that come from animals, including meat and dairy products such as butter, cheese, and whole milk.  · This diet  promotes weight loss, helps to control blood sugar levels, and helps to improve the way that the body uses insulin.  This information is not intended to replace advice given to you by your health care provider. Make sure you discuss any questions you have with your health care provider.  Document Revised: 04/10/2020 Document Reviewed: 01/09/2020  Elsevier Patient Education © 2021 [employer]    Exercising to Lose Weight  Exercise is structured, repetitive physical activity to improve fitness and health. Getting regular exercise is important for everyone. It is especially important if you are overweight. Being overweight increases your risk of heart disease, stroke, diabetes, high blood pressure, and several types of cancer. Reducing your calorie intake and exercising can help you lose weight.  Exercise is usually categorized as moderate or vigorous intensity. To lose weight, most people need to do a certain amount of moderate-intensity or vigorous-intensity exercise each week.  Moderate-intensity exercise    Moderate-intensity exercise is any activity that gets you moving enough to burn at least three times more energy (calories) than if you were sitting.  Examples of moderate exercise include:  · Walking a mile in 15 minutes.  · Doing light yard work.  · Biking at an easy pace.  Most people should get at least 150 minutes (2 hours and 30 minutes) a week of moderate-intensity exercise to maintain their body weight.  Vigorous-intensity exercise  Vigorous-intensity exercise is any activity that gets you moving enough to burn at least six times more calories than if you were sitting. When you exercise at this intensity, you should be working hard enough that you are not able to carry on a conversation.  Examples of vigorous exercise include:  · Running.  · Playing a team sport, such as football, basketball, and soccer.  · Jumping rope.  Most people should get at least 75 minutes (1 hour and 15 minutes) a week of  vigorous-intensity exercise to maintain their body weight.  How can exercise affect me?  When you exercise enough to burn more calories than you eat, you lose weight. Exercise also reduces body fat and builds muscle. The more muscle you have, the more calories you burn. Exercise also:  · Improves mood.  · Reduces stress and tension.  · Improves your overall fitness, flexibility, and endurance.  · Increases bone strength.  The amount of exercise you need to lose weight depends on:  · Your age.  · The type of exercise.  · Any health conditions you have.  · Your overall physical ability.  Talk to your health care provider about how much exercise you need and what types of activities are safe for you.  What actions can I take to lose weight?  Nutrition    · Make changes to your diet as told by your health care provider or diet and nutrition specialist (dietitian). This may include:  ? Eating fewer calories.  ? Eating more protein.  ? Eating less unhealthy fats.  ? Eating a diet that includes fresh fruits and vegetables, whole grains, low-fat dairy products, and lean protein.  ? Avoiding foods with added fat, salt, and sugar.  · Drink plenty of water while you exercise to prevent dehydration or heat stroke.  Activity  · Choose an activity that you enjoy and set realistic goals. Your health care provider can help you make an exercise plan that works for you.  · Exercise at a moderate or vigorous intensity most days of the week.  ? The intensity of exercise may vary from person to person. You can tell how intense a workout is for you by paying attention to your breathing and heartbeat. Most people will notice their breathing and heartbeat get faster with more intense exercise.  · Do resistance training twice each week, such as:  ? Push-ups.  ? Sit-ups.  ? Lifting weights.  ? Using resistance bands.  · Getting short amounts of exercise can be just as helpful as long structured periods of exercise. If you have trouble  finding time to exercise, try to include exercise in your daily routine.  ? Get up, stretch, and walk around every 30 minutes throughout the day.  ? Go for a walk during your lunch break.  ? Park your car farther away from your destination.  ? If you take public transportation, get off one stop early and walk the rest of the way.  ? Make phone calls while standing up and walking around.  ? Take the stairs instead of elevators or escalators.  · Wear comfortable clothes and shoes with good support.  · Do not exercise so much that you hurt yourself, feel dizzy, or get very short of breath.  Where to find more information  · [place] Department of Health and Human Services: www.hhs.gov  · Centers for Disease Control and Prevention (CDC): www.cdc.gov  Contact a health care provider:  · Before starting a new exercise program.  · If you have questions or concerns about your weight.  · If you have a medical problem that keeps you from exercising.  Get help right away if you have any of the following while exercising:  · Injury.  · Dizziness.  · Difficulty breathing or shortness of breath that does not go away when you stop exercising.  · Chest pain.  · Rapid heartbeat.  Summary  · Being overweight increases your risk of heart disease, stroke, diabetes, high blood pressure, and several types of cancer.  · Losing weight happens when you burn more calories than you eat.  · Reducing the amount of calories you eat in addition to getting regular moderate or vigorous exercise each week helps you lose weight.  This information is not intended to replace advice given to you by your health care provider. Make sure you discuss any questions you have with your health care provider.  Document Revised: 12/31/2018 Document Reviewed: 12/31/2018  [employer] Patient Education © 2021 [employer]    Calorie Counting for Weight Loss  Calories are units of energy. Your body needs a certain amount of calories from food to keep you going throughout  the day. When you eat more calories than your body needs, your body stores the extra calories as fat. When you eat fewer calories than your body needs, your body burns fat to get the energy it needs.  Calorie counting means keeping track of how many calories you eat and drink each day. Calorie counting can be helpful if you need to lose weight. If you make sure to eat fewer calories than your body needs, you should lose weight. Ask your health care provider what a healthy weight is for you.  For calorie counting to work, you will need to eat the right number of calories in a day in order to lose a healthy amount of weight per week. A dietitian can help you determine how many calories you need in a day and will give you suggestions on how to reach your calorie goal.  · A healthy amount of weight to lose per week is usually 1-2 lb (0.5-0.9 kg). This usually means that your daily calorie intake should be reduced by 500-750 calories.  · Eating 1,200 - 1,500 calories per day can help most women lose weight.  · Eating 1,500 - 1,800 calories per day can help most men lose weight.  What is my plan?  My goal is to have __________ calories per day.  If I have this many calories per day, I should lose around __________ pounds per week.  What do I need to know about calorie counting?  In order to meet your daily calorie goal, you will need to:  · Find out how many calories are in each food you would like to eat. Try to do this before you eat.  · Decide how much of the food you plan to eat.  · Write down what you ate and how many calories it had. Doing this is called keeping a food log.  To successfully lose weight, it is important to balance calorie counting with a healthy lifestyle that includes regular activity. Aim for 150 minutes of moderate exercise (such as walking) or 75 minutes of vigorous exercise (such as running) each week.  Where do I find calorie information?    The number of calories in a food can be found on a  Nutrition Facts label. If a food does not have a Nutrition Facts label, try to look up the calories online or ask your dietitian for help.  Remember that calories are listed per serving. If you choose to have more than one serving of a food, you will have to multiply the calories per serving by the amount of servings you plan to eat. For example, the label on a package of bread might say that a serving size is 1 slice and that there are 90 calories in a serving. If you eat 1 slice, you will have eaten 90 calories. If you eat 2 slices, you will have eaten 180 calories.  How do I keep a food log?  Immediately after each meal, record the following information in your food log:  · What you ate. Don't forget to include toppings, sauces, and other extras on the food.  · How much you ate. This can be measured in cups, ounces, or number of items.  · How many calories each food and drink had.  · The total number of calories in the meal.  Keep your food log near you, such as in a small notebook in your pocket, or use a mobile chela or website. Some programs will calculate calories for you and show you how many calories you have left for the day to meet your goal.  What are some calorie counting tips?    · Use your calories on foods and drinks that will fill you up and not leave you hungry:  ? Some examples of foods that fill you up are nuts and nut butters, vegetables, lean proteins, and high-fiber foods like whole grains. High-fiber foods are foods with more than 5 g fiber per serving.  ? Drinks such as sodas, specialty coffee drinks, alcohol, and juices have a lot of calories, yet do not fill you up.  · Eat nutritious foods and avoid empty calories. Empty calories are calories you get from foods or beverages that do not have many vitamins or protein, such as candy, sweets, and soda. It is better to have a nutritious high-calorie food (such as an avocado) than a food with few nutrients (such as a bag of chips).  · Know how  "many calories are in the foods you eat most often. This will help you calculate calorie counts faster.  · Pay attention to calories in drinks. Low-calorie drinks include water and unsweetened drinks.  · Pay attention to nutrition labels for \"low fat\" or \"fat free\" foods. These foods sometimes have the same amount of calories or more calories than the full fat versions. They also often have added sugar, starch, or salt, to make up for flavor that was removed with the fat.  · Find a way of tracking calories that works for you. Get creative. Try different apps or programs if writing down calories does not work for you.  What are some portion control tips?  · Know how many calories are in a serving. This will help you know how many servings of a certain food you can have.  · Use a measuring cup to measure serving sizes. You could also try weighing out portions on a kitchen scale. With time, you will be able to estimate serving sizes for some foods.  · Take some time to put servings of different foods on your favorite plates, bowls, and cups so you know what a serving looks like.  · Try not to eat straight from a bag or box. Doing this can lead to overeating. Put the amount you would like to eat in a cup or on a plate to make sure you are eating the right portion.  · Use smaller plates, glasses, and bowls to prevent overeating.  · Try not to multitask (for example, watch TV or use your computer) while eating. If it is time to eat, sit down at a table and enjoy your food. This will help you to know when you are full. It will also help you to be aware of what you are eating and how much you are eating.  What are tips for following this plan?  Reading food labels  · Check the calorie count compared to the serving size. The serving size may be smaller than what you are used to eating.  · Check the source of the calories. Make sure the food you are eating is high in vitamins and protein and low in saturated and trans " "fats.  Shopping  · Read nutrition labels while you shop. This will help you make healthy decisions before you decide to purchase your food.  · Make a grocery list and stick to it.  Cooking  · Try to cook your favorite foods in a healthier way. For example, try baking instead of frying.  · Use low-fat dairy products.  Meal planning  · Use more fruits and vegetables. Half of your plate should be fruits and vegetables.  · Include lean proteins like poultry and fish.  How do I count calories when eating out?  · Ask for smaller portion sizes.  · Consider sharing an entree and sides instead of getting your own entree.  · If you get your own entree, eat only half. Ask for a box at the beginning of your meal and put the rest of your entree in it so you are not tempted to eat it.  · If calories are listed on the menu, choose the lower calorie options.  · Choose dishes that include vegetables, fruits, whole grains, low-fat dairy products, and lean protein.  · Choose items that are boiled, broiled, grilled, or steamed. Stay away from items that are buttered, battered, fried, or served with cream sauce. Items labeled \"crispy\" are usually fried, unless stated otherwise.  · Choose water, low-fat milk, unsweetened iced tea, or other drinks without added sugar. If you want an alcoholic beverage, choose a lower calorie option such as a glass of wine or light beer.  · Ask for dressings, sauces, and syrups on the side. These are usually high in calories, so you should limit the amount you eat.  · If you want a salad, choose a garden salad and ask for grilled meats. Avoid extra toppings like bajwa, cheese, or fried items. Ask for the dressing on the side, or ask for olive oil and vinegar or lemon to use as dressing.  · Estimate how many servings of a food you are given. For example, a serving of cooked rice is ½ cup or about the size of half a baseball. Knowing serving sizes will help you be aware of how much food you are eating at " restaurants. The list below tells you how big or small some common portion sizes are based on everyday objects:  ? 1 oz--4 stacked dice.  ? 3 oz--1 deck of cards.  ? 1 tsp--1 die.  ? 1 Tbsp--½ a ping-pong ball.  ? 2 Tbsp--1 ping-pong ball.  ? ½ cup--½ baseball.  ? 1 cup--1 baseball.  Summary  · Calorie counting means keeping track of how many calories you eat and drink each day. If you eat fewer calories than your body needs, you should lose weight.  · A healthy amount of weight to lose per week is usually 1-2 lb (0.5-0.9 kg). This usually means reducing your daily calorie intake by 500-750 calories.  · The number of calories in a food can be found on a Nutrition Facts label. If a food does not have a Nutrition Facts label, try to look up the calories online or ask your dietitian for help.  · Use your calories on foods and drinks that will fill you up, and not on foods and drinks that will leave you hungry.  · Use smaller plates, glasses, and bowls to prevent overeating.  This information is not intended to replace advice given to you by your health care provider. Make sure you discuss any questions you have with your health care provider.  Document Revised: 09/06/2019 Document Reviewed: 11/17/2017  Elsevier Patient Education © 2020 [employer]

## 2021-04-13 NOTE — PROGRESS NOTES
Preventative Annual Visit    Carla Camarillo  1965   [id number]    Patient Care Team:  Elyse Escobedo MD as PCP - General (Internal Medicine)    Chief Complaint::   Chief Complaint   Patient presents with   • Annual Exam   • Hyperlipidemia     she's fasting   • Swollen ankle     L ankle, denies injury, is painful, has been going on for more than 2 weeks   • Tinnitus     been going on for a long time        Subjective   History of Present Illness    Carla Camarillo is a 55 y.o. female who presents for an Annual Wellness Visit.    HPI  March 4 felt hot and flushing and felt might pass out, eyes blurry. Called EMS and BP was high. About 180/? Had headache and a little dizzy. BP came down to 140s while EMS was there and she did not go to ER.     She checked yesterday and it was 128/82. One day 132/82.   Stress with son moving to [place] for a job. Parents getting older and she worries about them. Denies feeling down and depressed. Sleeping ok.     HPI  Painful and swollen L ankle when got up one am about 2 1/2 wks ago.No redness. Hurts inferior to Lateral maleolus when moving it or walking.Advil and tylenol no help. Swelling improving and pain improving a little.     HPI  Ring in ears for long time, but getting worse. No decrease in hearing. No ear pain.     HPI   FH of heart disease. First MI in Father at 62, had 5v. CABG.     CHRONIC CONDITIONS    GERD controlled by nexium    NAFLD DX on CT abdomen 2019 in [place].    Patient Active Problem List   Diagnosis   • Menopausal and postmenopausal disorder   • Annual physical exam   • Vitamin D deficiency   • Class 2 severe obesity due to excess calories with serious comorbidity and body mass index (BMI) of 36.0 to 36.9 in adult (CMS/HCC)   • Abnormal glucose   • Menopause   • Hypercholesterolemia   • Primary osteoarthritis of both hands   • Gastro-esophageal reflux disease with esophagitis   • History of thyrotoxicosis   • Vitamin B12 deficiency (non anemic)   •  Chronic neck pain   • Hair loss   • Fever blister   • Nonalcoholic fatty liver disease   • Impacted cerumen of left ear   • Pain and swelling of ankle, left        Past Medical History:   Diagnosis Date   • Self's esophagus 2014    repeat EGD 2 y, PPI    • GERD (gastroesophageal reflux disease)    • Graves disease 11/2010    Dr Simmons-methimazole    • Hiatal hernia    • 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  She was also advised to try a moist heat pack on the area when the pain flares up.   • Panic disorder without agoraphobia 1/6/2020   • Ptosis, left eyelid 2011    etiology uncertain; saw neurologist and opthalmologist   • RUQ abdominal pain 1/16/2020 4/16/2020 Elyse Escobedo MD  Continue taking the probiotic and the Benefiber daily.  Drink plenty of fluids.  Eat smaller portions at mealtime.  Work on weight loss. Try a moist heat pack as needed.  ER records and CT scan report from [facility] in [place] revealed fatty liver and mesenteric panniculitis.   • Sleep apnea    • Thyrotoxicosis 08/02/2011    w/o mention of goiter or other   • Tibial plateau fracture, right 10/2018   • Uterine prolapse        Past Surgical History:   Procedure Laterality Date   • CHOLECYSTECTOMY  1997   • ENDOSCOPY     • HYSTERECTOMY  2010    JAHAIRA/BSO       Family History   Problem Relation Age of Onset   • Osteoarthritis Mother    • Hypertension Mother    • Stroke Mother 76   • Hypertension Father    • Coronary artery disease Father 58   • Fibrocystic breast disease Sister    • Cancer Maternal Aunt 64        breast   • Alcohol abuse Paternal Grandfather    • Other Paternal Grandfather         smoking   • Cancer Paternal Grandfather         throat       Social History     Socioeconomic History   • Marital status:      Spouse name: Not on file   • Number of children: Not on file   • Years of education: Not on file   • Highest  education level: Not on file   Tobacco Use   • Smoking status: Never Smoker   • Smokeless tobacco: Never Used   • Tobacco comment: no passive smoke exposure   Substance and Sexual Activity   • Alcohol use: No   • Drug use: No       Allergies   Allergen Reactions   • Hydrocodone Bitartrate Er Other (See Comments)     GI upset, Lortab   • Morphine And Related Other (See Comments)     Decreased BP         Current Outpatient Medications:   •  esomeprazole (nexIUM) 40 MG capsule, TAKE 1 CAPSULE BY MOUTH EVERY DAY IN THE MORNING BEFORE BREAKFAST, Disp: 90 capsule, Rfl: 1  •  Omega-3 Fatty Acids (OMEGA-3 FISH OIL PO), Take 1 capsule by mouth Daily., Disp: , Rfl:   •  probiotic (CULTURELLE) capsule capsule, Take 1 capsule by mouth Daily., Disp: , Rfl:   •  valACYclovir (VALTREX) 1000 MG tablet, TAKE 1 TABLET BY MOUTH 2 (TWO) TIMES A DAY AS NEEDED (FEVER BLISTER)., Disp: 30 tablet, Rfl: 5  •  vitamin B-12 (CYANOCOBALAMIN) 1000 MCG tablet, Take 1,000 mcg by mouth Daily., Disp: , Rfl:   •  Vitamin D, Cholecalciferol, 25 MCG (1000 UT) tablet, Take 2,000 Units by mouth Daily., Disp: , Rfl:   •  Nutritional Supplements (ADULT NUTRITIONAL SUPPLEMENT + PO), Monolaurin 600mg: Take 1 capsule by mouth once daily, Disp: , Rfl:   •  valACYclovir (VALTREX) 1000 MG tablet, Take 1,000 mg by mouth Daily., Disp: , Rfl:     Immunization History   Administered Date(s) Administered   • COVID-19 (PFIZER) 03/10/2021, 04/07/2021   • Flu Vaccine Quad PF >18YRS 10/18/2016   • Hepatitis A 01/16/2020   • Influenza TIV (IM) 10/04/2010, 10/14/2011   • Influenza, Unspecified 11/18/2017   • Tdap 05/15/2013   • flucelvax quad pfs =>4 YRS 01/16/2020        Health Maintenance Due   Topic Date Due   • MAMMOGRAM  Never done   • ANNUAL PHYSICAL  Never done   • ZOSTER VACCINE (1 of 2) Never done   • HEPATITIS C SCREENING  Never done   • LIPID PANEL  01/16/2021        Review of Systems   Constitutional: Negative for chills, fatigue, fever, unexpected weight gain  "and unexpected weight loss.   HENT: Positive for tinnitus. Negative for ear pain, sinus pressure, sore throat, swollen glands and trouble swallowing.    Eyes: Negative for visual disturbance.   Respiratory: Negative for cough, shortness of breath and wheezing.    Cardiovascular: Negative for chest pain, palpitations and leg swelling.   Gastrointestinal: Negative for abdominal pain, blood in stool, constipation and diarrhea.   Endocrine: Negative for cold intolerance and heat intolerance.   Genitourinary: Negative for dysuria, frequency and urinary incontinence.   Musculoskeletal: Positive for arthralgias and joint swelling. Negative for back pain and gait problem.   Skin: Negative for rash and skin lesions.   Neurological: Negative for dizziness and headache.   Hematological: Negative for adenopathy. Does not bruise/bleed easily.   Psychiatric/Behavioral: Negative for sleep disturbance, suicidal ideas and depressed mood. The patient is not nervous/anxious.         Vital Signs  Vitals:    04/13/21 1119   BP: 132/84   BP Location: Right arm   Patient Position: Sitting   Cuff Size: Adult   Pulse: 88   Temp: 97.1 °F (36.2 °C)   TempSrc: Infrared   Weight: 91.3 kg (201 lb 4.8 oz)   Height: 157.5 cm (62\")   PainSc:   4   PainLoc: Ankle  Comment: L       Patient's Body mass index is 36.82 kg/m². BMI is above normal parameters. Recommendations include: educational material, exercise counseling and nutrition counseling.      Physical Exam  Vitals and nursing note reviewed.   Constitutional:       Appearance: She is well-developed. She is morbidly obese.   HENT:      Head: Normocephalic.   Eyes:      Conjunctiva/sclera: Conjunctivae normal.      Pupils: Pupils are equal, round, and reactive to light.   Neck:      Thyroid: No thyromegaly.   Cardiovascular:      Rate and Rhythm: Normal rate and regular rhythm.      Heart sounds: Normal heart sounds.   Pulmonary:      Effort: Pulmonary effort is normal.      Breath sounds: " Normal breath sounds. No wheezing.   Chest:      Breasts:         Right: No inverted nipple, mass, nipple discharge, skin change or tenderness.         Left: No inverted nipple, mass, nipple discharge, skin change or tenderness.   Abdominal:      General: Bowel sounds are normal.      Palpations: Abdomen is soft.      Tenderness: There is no abdominal tenderness.   Musculoskeletal:         General: Normal range of motion.      Cervical back: Normal range of motion and neck supple.      Left ankle: Swelling present. No deformity or ecchymosis. Tenderness present over the lateral malleolus. Normal range of motion.      Left Achilles Tendon: No tenderness.        Legs:    Lymphadenopathy:      Cervical: No cervical adenopathy.   Skin:     General: Skin is warm and dry.      Findings: No rash.   Neurological:      Mental Status: She is alert and oriented to person, place, and time.      Cranial Nerves: No cranial nerve deficit.      Sensory: No sensory deficit.      Coordination: Coordination normal.      Gait: Gait normal.   Psychiatric:         Speech: Speech normal.         Behavior: Behavior normal.         Thought Content: Thought content normal.         Judgment: Judgment normal.          Procedures     Fall Risk Screen:  [facility] Fall Risk Assessment has not been completed.    Health Habits and Functional and Cognitive Screening:  No flowsheet data found.    Smoking Status:  Social History     Tobacco Use   Smoking Status Never Smoker   Smokeless Tobacco Never Used   Tobacco Comment    no passive smoke exposure       Alcohol Consumption:  Social History     Substance and Sexual Activity   Alcohol Use No       Depression [place]  PHQ-9:    PHQ-2/PHQ-9 Depression Screening 4/13/2021   Little interest or pleasure in doing things 0   Feeling down, depressed, or hopeless 0   Total Score 0      ACE III MINI        Labs  Results for orders placed or performed in visit on 01/16/20   Comprehensive Metabolic Panel     Specimen: Blood   Result Value Ref Range    Glucose 90 65 - 99 mg/dL    BUN 12 6 - 20 mg/dL    Creatinine 0.67 0.57 - 1.00 mg/dL    Sodium 140 136 - 145 mmol/L    Potassium 3.8 3.5 - 5.2 mmol/L    Chloride 103 98 - 107 mmol/L    CO2 23.6 22.0 - 29.0 mmol/L    Calcium 8.9 8.6 - 10.5 mg/dL    Total Protein 7.4 6.0 - 8.5 g/dL    Albumin 4.50 3.50 - 5.20 g/dL    ALT (SGPT) 29 1 - 33 U/L    AST (SGOT) 24 1 - 32 U/L    Alkaline Phosphatase 101 39 - 117 U/L    Total Bilirubin 0.5 0.2 - 1.2 mg/dL    eGFR Non African Amer 92 >60 mL/min/1.73    Globulin 2.9 gm/dL    A/G Ratio 1.6 g/dL    BUN/Creatinine Ratio 17.9 7.0 - 25.0    Anion Gap 13.4 5.0 - 15.0 mmol/L   Lipid Panel    Specimen: Blood   Result Value Ref Range    Total Cholesterol 167 0 - 200 mg/dL    Triglycerides 97 0 - 150 mg/dL    HDL Cholesterol 50 40 - 60 mg/dL    LDL Cholesterol  98 0 - 100 mg/dL    VLDL Cholesterol 19.4 5 - 40 mg/dL    LDL/HDL Ratio 1.95    Urinalysis without microscopic (no culture) - Urine, Clean Catch    Specimen: Urine, Clean Catch   Result Value Ref Range    Color, UA Yellow Yellow, Straw    Appearance, UA Clear Clear    pH, UA 6.5 5.0 - 8.0    Specific Gravity, UA 1.023 1.005 - 1.030    Glucose, UA Negative Negative    Ketones, UA Negative Negative    Bilirubin, UA Negative Negative    Blood, UA Negative Negative    Protein, UA Negative Negative    Leuk Esterase, UA Negative Negative    Nitrite, UA Negative Negative    Urobilinogen, UA 1.0 E.U./dL 0.2 - 1.0 E.U./dL   TSH    Specimen: Blood   Result Value Ref Range    TSH 1.650 0.270 - 4.200 uIU/mL   Vitamin D 25 Hydroxy    Specimen: Blood   Result Value Ref Range    25 Hydroxy, Vitamin D 30.9 30.0 - 100.0 ng/ml   Vitamin B12    Specimen: Blood   Result Value Ref Range    Vitamin B-12 590 211 - 946 pg/mL   Hemoglobin A1c    Specimen: Blood   Result Value Ref Range    Hemoglobin A1C 5.40 4.80 - 5.60 %   T4, Free    Specimen: Blood   Result Value Ref Range    Free T4 1.32 0.93 - 1.70 ng/dL    Thyroid Peroxidase Antibody    Specimen: Blood   Result Value Ref Range    Thyroid Peroxidase Antibody 10 0 - 34 IU/mL   CBC Auto Differential    Specimen: Blood   Result Value Ref Range    WBC 7.61 3.40 - 10.80 10*3/mm3    RBC 4.64 3.77 - 5.28 10*6/mm3    Hemoglobin 13.6 12.0 - 15.9 g/dL    Hematocrit 41.3 34.0 - 46.6 %    MCV 89.0 79.0 - 97.0 fL    MCH 29.3 26.6 - 33.0 pg    MCHC 32.9 31.5 - 35.7 g/dL    RDW 13.0 12.3 - 15.4 %    RDW-SD 41.9 37.0 - 54.0 fl    MPV 10.1 6.0 - 12.0 fL    Platelets 295 140 - 450 10*3/mm3    Neutrophil % 53.4 42.7 - 76.0 %    Lymphocyte % 36.9 19.6 - 45.3 %    Monocyte % 5.5 5.0 - 12.0 %    Eosinophil % 3.4 0.3 - 6.2 %    Basophil % 0.7 0.0 - 1.5 %    Immature Grans % 0.1 0.0 - 0.5 %    Neutrophils, Absolute 4.06 1.70 - 7.00 10*3/mm3    Lymphocytes, Absolute 2.81 0.70 - 3.10 10*3/mm3    Monocytes, Absolute 0.42 0.10 - 0.90 10*3/mm3    Eosinophils, Absolute 0.26 0.00 - 0.40 10*3/mm3    Basophils, Absolute 0.05 0.00 - 0.20 10*3/mm3    Immature Grans, Absolute 0.01 0.00 - 0.05 10*3/mm3    nRBC 0.0 0.0 - 0.2 /100 WBC        Assessment/Plan     Patient Self-Management and Personalized Health Advice    The patient has been provided counseling and guidance about: diet, exercise, weight management and prevention of cardiac or vascular disease and preventive services including:   · Annual Wellness Visit (AWV).  Patient Instructions   Problem List Items Addressed This Visit        Cardiac and Vasculature    Hypercholesterolemia    Overview     4/13/2021 Elyse Escobedo MD    Decrease fats and sugars in the diet.  Increase exercise and physical activity.  Try to walk some every day.  Work on weight loss.         Relevant Orders    CBC & Differential    Lipid Panel    TSH    Microalbumin / Creatinine Urine Ratio - Urine, Clean Catch    Urinalysis without microscopic (no culture) - Urine, Clean Catch       ENT    Impacted cerumen of left ear (Chronic)    Overview     4/13/2021 Elyse Escobedo,  MD                Endocrine and Metabolic    Class 2 severe obesity due to excess calories with serious comorbidity and body mass index (BMI) of 36.0 to 36.9 in adult (CMS/HCC) (Chronic)    Overview     4/13/2021 Elyse Escobedo MD    Increase exercise and physical activity.  Try to walk some every day, use hand weights at home.    Make better food choices.  Avoid snacking and fried foods and sugars.  Avoid sodas.  Eat smaller portions at mealtime.    Weigh once a week on Monday mornings to monitor progress.  Try to lose about 4 pounds a month.         Vitamin D deficiency    Overview     4/13/2021 Elyse Escobedo MD    Continue 2000 units vitamin D3 daily.         Relevant Medications    Vitamin D, Cholecalciferol, 25 MCG (1000 UT) tablet    Other Relevant Orders    Vitamin D 25 Hydroxy    Abnormal glucose    Overview     4/13/2021 Elyse Escobedo MD    Continue to decrease sugars and white carbohydrates in the diet.  Increase exercise and physical activity.  Work on weight loss to prevent progression to diabetes.         Relevant Orders    Hemoglobin A1c    Vitamin B12 deficiency (non anemic)    Overview     4/13/2021 Elyse Escobedo MD    Check blood level today.         Relevant Orders    Vitamin B12       Gastrointestinal Abdominal     Nonalcoholic fatty liver disease (Chronic)    Overview     4/13/2021 Elyse Escobedo MD    Patient advised to eat less fatty foods and less sugars and work on weight loss.    Check FibroSure test today to determine degree of fibrosis in the liver.    Patient declines ultrasound with elastography at this time.         Relevant Orders    GUPTA Fibrosure    Comprehensive Metabolic Panel    Hepatitis C Antibody    Gastro-esophageal reflux disease with esophagitis    Overview     4/13/2021 Elyse Escobedo MD    Continue Nexium daily.  Continue to avoid eating close to bedtime and avoid large meals.  Continue sleeping with the head of the bed elevated.  Weight loss often  helps.         Relevant Medications    esomeprazole (nexIUM) 40 MG capsule       Health Encounters    Annual physical exam - Primary    Overview     Patient is up-to-date on vaccinations except for the new shingles vaccine which she may get at her pharmacy.  She also needs to get the second hepatitis A vaccine.    She promises to call and schedule her mammogram.            Musculoskeletal and Injuries    Pain and swelling of ankle, left    Overview     4/13/2021 Elyse Escobedo MD    X-ray today to rule out stress fracture.    Elevate the leg with a cold pack around the ankle.  Wrap it with an Ace wrap.  Always wear good supportive shoes.  Do not go barefoot.         Relevant Orders    XR Ankle 3+ View Right             Diagnosis Plan   1. Annual physical exam     2. Pain and swelling of ankle, left  XR Ankle 3+ View Right   3. Nonalcoholic fatty liver disease  GUPTA Fibrosure    Comprehensive Metabolic Panel    Hepatitis C Antibody   4. Gastroesophageal reflux disease with esophagitis without hemorrhage     5. Class 2 severe obesity due to excess calories with serious comorbidity and body mass index (BMI) of 36.0 to 36.9 in adult (CMS/HCC)     6. Hypercholesterolemia  CBC & Differential    Lipid Panel    TSH    Microalbumin / Creatinine Urine Ratio - Urine, Clean Catch    Urinalysis without microscopic (no culture) - Urine, Clean Catch   7. Abnormal glucose  Hemoglobin A1c   8. Vitamin D deficiency  Vitamin D 25 Hydroxy   9. Vitamin B12 deficiency (non anemic)  Vitamin B12   10. Impacted cerumen of left ear         Outpatient Encounter Medications as of 4/13/2021   Medication Sig Dispense Refill   • esomeprazole (nexIUM) 40 MG capsule TAKE 1 CAPSULE BY MOUTH EVERY DAY IN THE MORNING BEFORE BREAKFAST 90 capsule 1   • Omega-3 Fatty Acids (OMEGA-3 FISH OIL PO) Take 1 capsule by mouth Daily.     • probiotic (CULTURELLE) capsule capsule Take 1 capsule by mouth Daily.     • valACYclovir (VALTREX) 1000 MG tablet TAKE 1  TABLET BY MOUTH 2 (TWO) TIMES A DAY AS NEEDED (FEVER BLISTER). 30 tablet 5   • vitamin B-12 (CYANOCOBALAMIN) 1000 MCG tablet Take 1,000 mcg by mouth Daily.     • Vitamin D, Cholecalciferol, 25 MCG (1000 UT) tablet Take 2,000 Units by mouth Daily.     • Nutritional Supplements (ADULT NUTRITIONAL SUPPLEMENT + PO) Monolaurin 600mg: Take 1 capsule by mouth once daily     • valACYclovir (VALTREX) 1000 MG tablet Take 1,000 mg by mouth Daily.       No facility-administered encounter medications on file as of 4/13/2021.         Age appropriate preventive counseling done including age appropriate vaccines,regular  Mammogram and self breast exam, pap smear, colonoscopy, regular dental visits, mental health, injury prevention such as wearing seat belt and preventing falls, healthy  nutrition, healthy weight, regular physical exercise. Alcohol use is moderate.  Tobacco history-none. Drug use-none.  STD's-not at risk.    Follow Up:  Return in about 6 months (around 10/13/2021) for recheck fasting.         An After Visit Summary and PPPS with all of these plans were given to the patient.      Note: Part of this note may be an electronic transcription/translation of spoken language to printed text using the Dragon Dictation System.     Elyse Escobedo MD

## 2021-04-14 LAB
25(OH)D3 SERPL-MCNC: 33.7 NG/ML (ref 30–100)
A2 MACROGLOB SERPL-MCNC: 163 MG/DL (ref 110–276)
ALBUMIN SERPL-MCNC: 4.4 G/DL (ref 3.5–5.2)
ALBUMIN UR-MCNC: <1.2 MG/DL
ALBUMIN/GLOB SERPL: 1.6 G/DL
ALP SERPL-CCNC: 111 U/L (ref 39–117)
ALT SERPL W P-5'-P-CCNC: 42 U/L (ref 1–33)
ALT SERPL W P-5'-P-CCNC: 51 IU/L (ref 0–40)
ANION GAP SERPL CALCULATED.3IONS-SCNC: 9.5 MMOL/L (ref 5–15)
APO A-I SERPL-MCNC: 136 MG/DL (ref 116–209)
AST SERPL W P-5'-P-CCNC: 45 IU/L (ref 0–40)
AST SERPL-CCNC: 35 U/L (ref 1–32)
BILIRUB SERPL-MCNC: 0.4 MG/DL (ref 0–1.2)
BILIRUB SERPL-MCNC: 0.5 MG/DL (ref 0–1.2)
BUN SERPL-MCNC: 11 MG/DL (ref 6–20)
BUN/CREAT SERPL: 17.2 (ref 7–25)
CALCIUM SPEC-SCNC: 9.3 MG/DL (ref 8.6–10.5)
CHLORIDE SERPL-SCNC: 102 MMOL/L (ref 98–107)
CHOLEST SERPL-MCNC: 175 MG/DL (ref 100–199)
CHOLEST SERPL-MCNC: 178 MG/DL (ref 0–200)
CO2 SERPL-SCNC: 26.5 MMOL/L (ref 22–29)
CREAT SERPL-MCNC: 0.64 MG/DL (ref 0.57–1)
CREAT UR-MCNC: 119 MG/DL
FIBROSIS SCORING:: ABNORMAL
FIBROSIS STAGE SERPL QL: ABNORMAL
GFR SERPL CREATININE-BSD FRML MDRD: 97 ML/MIN/1.73
GGT SERPL-CCNC: 18 IU/L (ref 0–60)
GLOBULIN UR ELPH-MCNC: 2.8 GM/DL
GLUCOSE SERPL-MCNC: 96 MG/DL (ref 65–99)
GLUCOSE SERPL-MCNC: 99 MG/DL (ref 65–99)
HAPTOGLOB SERPL-MCNC: 162 MG/DL (ref 33–346)
HCV AB SER DONR QL: NORMAL
HDLC SERPL-MCNC: 47 MG/DL (ref 40–60)
INTERPRETATIONS: (REFERENCE): ABNORMAL
LABORATORY COMMENT REPORT: ABNORMAL
LDLC SERPL CALC-MCNC: 112 MG/DL (ref 0–100)
LDLC/HDLC SERPL: 2.34 {RATIO}
LIVER FIBR SCORE SERPL CALC.FIBROSURE: 0.1 (ref 0–0.21)
MICROALBUMIN/CREAT UR: NORMAL MG/G{CREAT}
NASH SCORING (REFERENCE): ABNORMAL
NECROINFLAMMATORY ACT GRADE SERPL QL: ABNORMAL
NECROINFLAMMATORY ACT SCORE SERPL: 0.5
POTASSIUM SERPL-SCNC: 4.3 MMOL/L (ref 3.5–5.2)
PROT SERPL-MCNC: 7.2 G/DL (ref 6–8.5)
SERVICE CMNT-IMP: ABNORMAL
SODIUM SERPL-SCNC: 138 MMOL/L (ref 136–145)
STEATOSIS GRADE (REFERENCE): ABNORMAL
STEATOSIS GRADING (REFERENCE): ABNORMAL
STEATOSIS SCORE (REFERENCE): 0.64 (ref 0–0.3)
TRIGL SERPL-MCNC: 105 MG/DL (ref 0–150)
TRIGL SERPL-MCNC: 119 MG/DL (ref 0–149)
TSH SERPL DL<=0.05 MIU/L-ACNC: 1.59 UIU/ML (ref 0.27–4.2)
VIT B12 BLD-MCNC: 722 PG/ML (ref 211–946)
VLDLC SERPL-MCNC: 19 MG/DL (ref 5–40)

## 2021-07-07 ENCOUNTER — OFFICE VISIT (OUTPATIENT)
Dept: INTERNAL MEDICINE | Facility: CLINIC | Age: 56
End: 2021-07-07

## 2021-07-07 VITALS
TEMPERATURE: 98.4 F | HEIGHT: 62 IN | BODY MASS INDEX: 36.25 KG/M2 | HEART RATE: 85 BPM | SYSTOLIC BLOOD PRESSURE: 138 MMHG | WEIGHT: 197 LBS | DIASTOLIC BLOOD PRESSURE: 80 MMHG

## 2021-07-07 DIAGNOSIS — R73.09 ABNORMAL GLUCOSE: ICD-10-CM

## 2021-07-07 DIAGNOSIS — E66.01 CLASS 2 SEVERE OBESITY DUE TO EXCESS CALORIES WITH SERIOUS COMORBIDITY AND BODY MASS INDEX (BMI) OF 36.0 TO 36.9 IN ADULT (HCC): Chronic | ICD-10-CM

## 2021-07-07 DIAGNOSIS — M25.572 PAIN AND SWELLING OF ANKLE, LEFT: Primary | ICD-10-CM

## 2021-07-07 DIAGNOSIS — M25.472 PAIN AND SWELLING OF ANKLE, LEFT: Primary | ICD-10-CM

## 2021-07-07 DIAGNOSIS — K76.0 NONALCOHOLIC FATTY LIVER DISEASE: Chronic | ICD-10-CM

## 2021-07-07 PROCEDURE — 99214 OFFICE O/P EST MOD 30 MIN: CPT | Performed by: INTERNAL MEDICINE

## 2021-07-07 NOTE — PATIENT INSTRUCTIONS
Patient Instructions   Problem List Items Addressed This Visit        Endocrine and Metabolic    Class 2 severe obesity due to excess calories with serious comorbidity and body mass index (BMI) of 36.0 to 36.9 in adult (CMS/HCC) (Chronic)    Overview     7/7/2021 Elyse Escobedo MD    Increase exercise and physical activity.  Try to walk some every day, use hand weights about 5 to 10 minutes a day at home.    Make better food choices.  Avoid snacking and fried foods and sugars.  Avoid sodas.  Eat smaller portions at mealtime.    Weigh once a week on Monday mornings to monitor progress.  Try to lose about 4 pounds a month.         Abnormal glucose    Overview     7/7/2021 Elyse Escobedo MD    A1c in April was 5.8 which is corresponding to an average blood sugar of about 120-122.      Continue to decrease sugars and white carbohydrates in the diet.  Increase exercise and physical activity.  Work on weight loss to prevent progression to diabetes.            Gastrointestinal Abdominal     Nonalcoholic fatty liver disease (Chronic)    Overview     7/7/2021 Elyse Escobedo MD    FibroSure test in April was consistent with steatosis without fibrosis.  Patient declined ultrasound with elastography.    Patient advised to eat less fatty foods and less sugars and work on weight loss.                 Musculoskeletal and Injuries    Pain and swelling of ankle, left - Primary    Overview     7/7/2021 Elyse Escobedo MD    Pain and swelling are around the left lateral malleolus.  There is some thickening of the left Achilles tendon on exam but it is not tender and the patient denies Achilles pain or plantar pain.      X-ray in April did not show any sign of fracture.  Mild degenerative changes, but joint spaces were well preserved.  Patient denies any injury.    Elevate the leg with a cold pack around the ankle.  May try ibuprofen with food for a few days to decrease inflammation.  Wrap it with an Ace wrap.  Always wear  good supportive shoes.  Do not go barefoot.    Refer to podiatrist.         Relevant Orders    Ambulatory Referral to Podiatry (Completed)

## 2021-07-07 NOTE — PROGRESS NOTES
[facility]     Carla Camarillo  1965   [id number]      Patient Care Team:  Elyse Escobedo MD as PCP - General (Internal Medicine)    Chief Complaint   Patient presents with   • Ankle Pain     left   • Joint Swelling            HPI  Patient is a 55 y.o. female presents with L lateral ankle pain and swelling for 3-4 months,unchanged until this am when it was worse. Usually about 4-5/10 then today 7-8/10. Made her limp to walk.  It is generally worse first thing in the morning or when she gets up after sitting a long time.  She denies any plantar pain.  She has had plantar fascitis before and she states this is totally different.  She also denies any significant pain in the Achilles tendon.  It hurts when she walks, but not at rest.  She states that she tried the cold pack a few days after I saw her then gave up since it did not seem to help.  She is not wrapping it.  She is wearing flip-flops and sandals.  She does sometimes wear athletic shoes.      CHRONIC CONDITIONS  We reviewed elevation of liver enzymes and fatty liver fibrosure test.    We reviewed her A1c from April 5.8.  She has not been exercising any.  She has not lost any weight.  She does try to eat less fats and sugars.    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  She was also advised to try a moist heat pack on the area when the pain flares up.   • Panic disorder without agoraphobia 1/6/2020   • Ptosis, left eyelid 2011    etiology uncertain; saw neurologist and opthalmologist   • RUQ abdominal pain 1/16/2020 4/16/2020 Elyse Escobedo MD  Continue taking the probiotic and the Benefiber daily.  Drink plenty of fluids.  Eat smaller portions  at mealtime.  Work on weight loss. Try a moist heat pack as needed.  ER records and CT scan report from [facility] in [place] revealed fatty liver and mesenteric panniculitis.   • Sleep apnea    • Thyrotoxicosis 08/02/2011    w/o mention of goiter or other   • Tibial plateau fracture, right 10/2018   • Uterine prolapse        Past Surgical History:   Procedure Laterality Date   • CHOLECYSTECTOMY  1997   • ENDOSCOPY     • HYSTERECTOMY  2010    JAHAIRA/BSO       Family History   Problem Relation Age of Onset   • Osteoarthritis Mother    • Hypertension Mother    • Stroke Mother 76   • Hypertension Father    • Coronary artery disease Father 58   • Fibrocystic breast disease Sister    • Cancer Maternal Aunt 64        breast   • Alcohol abuse Paternal Grandfather    • Other Paternal Grandfather         smoking   • Cancer Paternal Grandfather         throat       Social History     Socioeconomic History   • Marital status:      Spouse name: Not on file   • Number of children: Not on file   • Years of education: Not on file   • Highest education level: Not on file   Tobacco Use   • Smoking status: Never Smoker   • Smokeless tobacco: Never Used   • Tobacco comment: no passive smoke exposure   Substance and Sexual Activity   • Alcohol use: No   • Drug use: No       Allergies   Allergen Reactions   • Hydrocodone Bitartrate Er Other (See Comments)     GI upset, Lortab   • Morphine And Related Other (See Comments)     Decreased BP       Review of Systems:     Review of Systems   Constitutional: Negative for chills, fatigue and fever.   HENT: Negative for congestion, ear pain and sinus pressure.    Respiratory: Negative for cough, chest tightness, shortness of breath and wheezing.    Cardiovascular: Negative for chest pain and palpitations.   Gastrointestinal: Negative for abdominal pain, blood in stool and constipation.   Musculoskeletal: Positive for arthralgias and joint swelling.   Skin: Negative for color  "change.   Allergic/Immunologic: Negative for environmental allergies.   Neurological: Negative for dizziness and headache.   Psychiatric/Behavioral: Negative for depressed mood. The patient is not nervous/anxious.        Vital Signs  Vitals:    07/07/21 1502   BP: 138/80   BP Location: Right arm   Patient Position: Sitting   Cuff Size: Adult   Pulse: 85   Temp: 98.4 °F (36.9 °C)   TempSrc: Temporal   Weight: 89.4 kg (197 lb)   Height: 157.5 cm (62.01\")   PainSc:   6   Patient's Body mass index is 36.02 kg/m². indicating that she is obese (BMI >30). Obesity-related health conditions include the following: dyslipidemias. Obesity is unchanged. BMI is is above average; BMI management plan is completed. We discussed low calorie, low carb based diet program, portion control and increasing exercise..    Body mass index is 36.02 kg/m².      Current Outpatient Medications:   •  esomeprazole (nexIUM) 40 MG capsule, TAKE 1 CAPSULE BY MOUTH EVERY DAY IN THE MORNING BEFORE BREAKFAST, Disp: 90 capsule, Rfl: 1  •  Omega-3 Fatty Acids (OMEGA-3 FISH OIL PO), Take 1 capsule by mouth Daily., Disp: , Rfl:   •  probiotic (CULTURELLE) capsule capsule, Take 1 capsule by mouth Daily., Disp: , Rfl:   •  valACYclovir (VALTREX) 1000 MG tablet, Take 1,000 mg by mouth Daily., Disp: , Rfl:   •  valACYclovir (VALTREX) 1000 MG tablet, TAKE 1 TABLET BY MOUTH 2 (TWO) TIMES A DAY AS NEEDED (FEVER BLISTER)., Disp: 30 tablet, Rfl: 5  •  vitamin B-12 (CYANOCOBALAMIN) 1000 MCG tablet, Take 1,000 mcg by mouth Daily., Disp: , Rfl:   •  Vitamin D, Cholecalciferol, 25 MCG (1000 UT) tablet, Take 2,000 Units by mouth Daily., Disp: , Rfl:     Physical Exam:    Physical Exam  Vitals and nursing note reviewed.   Constitutional:       Appearance: She is well-developed. She is obese.   HENT:      Head: Normocephalic.   Eyes:      Conjunctiva/sclera: Conjunctivae normal.      Pupils: Pupils are equal, round, and reactive to light.   Neck:      Thyroid: No " thyromegaly.   Cardiovascular:      Rate and Rhythm: Normal rate and regular rhythm.      Heart sounds: Normal heart sounds.   Pulmonary:      Effort: Pulmonary effort is normal.      Breath sounds: Normal breath sounds. No wheezing.   Musculoskeletal:         General: Normal range of motion.      Cervical back: Normal range of motion and neck supple.      Left ankle: Swelling present. No deformity. Tenderness present over the lateral malleolus. Normal range of motion.      Left Achilles Tendon: No tenderness or defects.   Lymphadenopathy:      Cervical: No cervical adenopathy.   Skin:     General: Skin is warm and dry.   Neurological:      Mental Status: She is alert and oriented to person, place, and time.   Psychiatric:         Thought Content: Thought content normal.          ACE III MINI        Results Review:    None    CMP:  Lab Results   Component Value Date    BUN 11 04/13/2021    CREATININE 0.64 04/13/2021    EGFRIFNONA 97 04/13/2021    BCR 17.2 04/13/2021     04/13/2021    K 4.3 04/13/2021    CO2 26.5 04/13/2021    CALCIUM 9.3 04/13/2021    ALBUMIN 4.40 04/13/2021    BILITOT 0.5 04/13/2021    ALKPHOS 111 04/13/2021    AST 35 (H) 04/13/2021    ALT 42 (H) 04/13/2021     HbA1c:  Lab Results   Component Value Date    HGBA1C 5.80 (H) 04/13/2021    HGBA1C 5.40 01/16/2020     Microalbumin:  Lab Results   Component Value Date    MICROALBUR <1.2 04/13/2021     Lipid Panel  Lab Results   Component Value Date    CHOL 178 04/13/2021    TRIG 119 04/13/2021    TRIG 105 04/13/2021    HDL 47 04/13/2021     (H) 04/13/2021    AST 35 (H) 04/13/2021    ALT 42 (H) 04/13/2021       Medication Review: Medications reviewed and noted  Patient Instructions   Problem List Items Addressed This Visit        Endocrine and Metabolic    Class 2 severe obesity due to excess calories with serious comorbidity and body mass index (BMI) of 36.0 to 36.9 in adult (CMS/[facility]) (Chronic)    Overview     7/7/2021 Elyse Escobedo,  MD    Increase exercise and physical activity.  Try to walk some every day, use hand weights about 5 to 10 minutes a day at home.    Make better food choices.  Avoid snacking and fried foods and sugars.  Avoid sodas.  Eat smaller portions at mealtime.    Weigh once a week on Monday mornings to monitor progress.  Try to lose about 4 pounds a month.         Abnormal glucose    Overview     7/7/2021 Elyse Escobedo MD    A1c in April was 5.8 which is corresponding to an average blood sugar of about 120-122.      Continue to decrease sugars and white carbohydrates in the diet.  Increase exercise and physical activity.  Work on weight loss to prevent progression to diabetes.            Gastrointestinal Abdominal     Nonalcoholic fatty liver disease (Chronic)    Overview     7/7/2021 Elyse Escboedo MD    FibroSure test in April was consistent with steatosis without fibrosis.  Patient declined ultrasound with elastography.    Patient advised to eat less fatty foods and less sugars and work on weight loss.                 Musculoskeletal and Injuries    Pain and swelling of ankle, left - Primary    Overview     7/7/2021 Elyse Escobedo MD    Pain and swelling are around the left lateral malleolus.  There is some thickening of the left Achilles tendon on exam but it is not tender and the patient denies Achilles pain or plantar pain.      X-ray in April did not show any sign of fracture.  Mild degenerative changes, but joint spaces were well preserved.  Patient denies any injury.    Elevate the leg with a cold pack around the ankle.  May try ibuprofen with food for a few days to decrease inflammation.  Wrap it with an Ace wrap.  Always wear good supportive shoes.  Do not go barefoot.    Refer to podiatrist.         Relevant Orders    Ambulatory Referral to Podiatry (Completed)             Diagnosis Plan   1. Pain and swelling of ankle, left  Ambulatory Referral to Podiatry   2. Nonalcoholic fatty liver disease     3.  Abnormal glucose     4. Class 2 severe obesity due to excess calories with serious comorbidity and body mass index (BMI) of 36.0 to 36.9 in adult (CMS/[facility])             Plan of care reviewed with patient at the conclusion of today's visit. Education was provided regarding diagnosis, management, and any prescribed or recommended OTC medications.Patient verbalizes understanding of and agreement with management plan.         Elyse Escobedo MD

## 2021-08-09 RX ORDER — ESOMEPRAZOLE MAGNESIUM 40 MG/1
CAPSULE, DELAYED RELEASE ORAL
Qty: 90 CAPSULE | Refills: 1 | Status: SHIPPED | OUTPATIENT
Start: 2021-08-09 | End: 2022-02-09

## 2022-02-09 RX ORDER — ESOMEPRAZOLE MAGNESIUM 40 MG/1
CAPSULE, DELAYED RELEASE ORAL
Qty: 90 CAPSULE | Refills: 1 | Status: SHIPPED | OUTPATIENT
Start: 2022-02-09 | End: 2022-06-24

## 2022-03-16 ENCOUNTER — OFFICE VISIT (OUTPATIENT)
Dept: INTERNAL MEDICINE | Facility: CLINIC | Age: 57
End: 2022-03-16

## 2022-03-16 VITALS
WEIGHT: 205.4 LBS | DIASTOLIC BLOOD PRESSURE: 76 MMHG | OXYGEN SATURATION: 97 % | HEIGHT: 62 IN | TEMPERATURE: 96.2 F | HEART RATE: 91 BPM | BODY MASS INDEX: 37.8 KG/M2 | SYSTOLIC BLOOD PRESSURE: 128 MMHG

## 2022-03-16 DIAGNOSIS — E66.01 CLASS 2 SEVERE OBESITY DUE TO EXCESS CALORIES WITH SERIOUS COMORBIDITY AND BODY MASS INDEX (BMI) OF 37.0 TO 37.9 IN ADULT: Chronic | ICD-10-CM

## 2022-03-16 DIAGNOSIS — N64.4 BREAST PAIN IN FEMALE: Primary | Chronic | ICD-10-CM

## 2022-03-16 PROCEDURE — 99214 OFFICE O/P EST MOD 30 MIN: CPT | Performed by: INTERNAL MEDICINE

## 2022-03-16 NOTE — PROGRESS NOTES
"[facility]     Carla Camarillo  1965   [id number]      Patient Care Team:  Elyse Escobedo MD as PCP - General (Internal Medicine)    Chief Complaint   Patient presents with   • Breast Pain     Right breast - swollen and painful            HPI  Patient is a 56 y.o. female presents with right breast pain. The patient has been experiencing the pain for 3 weeks. The patient describes the pain as an ache. She mentions that sometimes the area feels like \"prickly pins\" when she turns to reach for something. The patient states that using her right arm makes the pain worse. The patient states that the area is not sore to touch. There is mild soreness under the patient's right arm. The patient  states that she does consume caffeine, drinking a large iced coffee in the morning and a 12 ounce Pepsi a day, which is about 4-5 cups of caffeine containing liquids. The patient has not treated the breast pain with medication; she has not used a heating pad. The patient states that there are no changes in her medications. The patient denies redness on the skin. The patient denies having fevers or chills. The patient denies feeling a general body ache or feeling sick. The patient denies feeling tired. The patient denies doing any heavy lifting.    The patient's last mammogram was 10/11/2021; she goes to [facility]. Her mammogram was normal, but she has a history of fibroglandular densities, fibrocystic disease.        CHRONIC CONDITIONS      Past Medical History:   Diagnosis Date   • Self's esophagus 2014    repeat EGD 2 y, PPI    • GERD (gastroesophageal reflux disease)    • Graves disease 11/2010    Dr Simmons-methimazole    • Hiatal hernia    • Mesenteric panniculitis (HCC) 4/16/2020 4/16/2020 Elyse Escobedo MD  Patient advised to work on weight loss.  She was also advised to try a moist heat pack on the area when the pain flares up.   • Panic disorder without agoraphobia 1/6/2020   • " "Ptosis, left eyelid 2011    etiology uncertain; saw neurologist and opthalmologist   • RUQ abdominal pain 1/16/2020 4/16/2020 Elyse Escobedo MD  Continue taking the probiotic and the Benefiber daily.  Drink plenty of fluids.  Eat smaller portions at mealtime.  Work on weight loss. Try a moist heat pack as needed.  ER records and CT scan report from [facility] in [place] revealed fatty liver and mesenteric panniculitis.   • Sleep apnea    • Thyrotoxicosis 08/02/2011    w/o mention of goiter or other   • Tibial plateau fracture, right 10/2018   • Uterine prolapse        Past Surgical History:   Procedure Laterality Date   • CHOLECYSTECTOMY  1997   • ENDOSCOPY     • HYSTERECTOMY  2010    JAHAIRA/BSO       Family History   Problem Relation Age of Onset   • Osteoarthritis Mother    • Hypertension Mother    • Stroke Mother 76   • Hypertension Father    • Coronary artery disease Father 58   • Fibrocystic breast disease Sister    • Cancer Maternal Aunt 64        breast   • Alcohol abuse Paternal Grandfather    • Other Paternal Grandfather         smoking   • Cancer Paternal Grandfather         throat       Social History     Socioeconomic History   • Marital status:    Tobacco Use   • Smoking status: Never Smoker   • Smokeless tobacco: Never Used   • Tobacco comment: no passive smoke exposure   Substance and Sexual Activity   • Alcohol use: No   • Drug use: No       Allergies   Allergen Reactions   • Hydrocodone Bitartrate Er Other (See Comments)     GI upset, Lortab   • Morphine And Related Other (See Comments)     Decreased BP          Vital Signs  Vitals:    03/16/22 1412   BP: 128/76   BP Location: Left arm   Patient Position: Sitting   Cuff Size: Adult   Pulse: 91   Temp: 96.2 °F (35.7 °C)   TempSrc: Infrared   SpO2: 97%   Weight: 93.2 kg (205 lb 6.4 oz)   Height: 157.5 cm (62.01\")   PainSc:   5   PainLoc: Breast     Body mass index is 37.56 kg/m².  Patient's Body mass index is 37.56 kg/m². " indicating that she is morbidly obese (BMI > 40 or > 35 with obesity - related health condition). Obesity-related health conditions include the following: dyslipidemias. Obesity is unchanged. BMI is is above average; BMI management plan is completed. We discussed low calorie, low carb based diet program, portion control and increasing exercise..        Current Outpatient Medications:   •  esomeprazole (nexIUM) 40 MG capsule, TAKE 1 CAPSULE BY MOUTH EVERY DAY IN THE MORNING BEFORE BREAKFAST, Disp: 90 capsule, Rfl: 1  •  Omega-3 Fatty Acids (OMEGA-3 FISH OIL PO), Take 1 capsule by mouth Daily., Disp: , Rfl:   •  probiotic (CULTURELLE) capsule capsule, Take 1 capsule by mouth Daily., Disp: , Rfl:   •  valACYclovir (VALTREX) 1000 MG tablet, Take 1,000 mg by mouth Daily., Disp: , Rfl:   •  vitamin B-12 (CYANOCOBALAMIN) 1000 MCG tablet, Take 1,000 mcg by mouth Daily., Disp: , Rfl:   •  Vitamin D, Cholecalciferol, 25 MCG (1000 UT) tablet, Take 2,000 Units by mouth Daily., Disp: , Rfl:   •  valACYclovir (VALTREX) 1000 MG tablet, TAKE 1 TABLET BY MOUTH 2 (TWO) TIMES A DAY AS NEEDED (FEVER BLISTER)., Disp: 30 tablet, Rfl: 5    Physical Exam:    Physical Exam  Vitals and nursing note reviewed.   Constitutional:       Appearance: She is well-developed. She is morbidly obese.   HENT:      Head: Normocephalic.   Eyes:      Conjunctiva/sclera: Conjunctivae normal.      Pupils: Pupils are equal, round, and reactive to light.   Neck:      Thyroid: No thyromegaly.   Cardiovascular:      Rate and Rhythm: Normal rate and regular rhythm.      Heart sounds: Normal heart sounds.   Pulmonary:      Effort: Pulmonary effort is normal.      Breath sounds: Normal breath sounds. No wheezing.   Chest:      Chest wall: No mass, deformity or tenderness.   Breasts: Breasts are symmetrical.      Right: Normal. No axillary adenopathy or supraclavicular adenopathy.      Left: Normal. No axillary adenopathy or supraclavicular adenopathy.          Musculoskeletal:         General: Normal range of motion.      Cervical back: Normal range of motion and neck supple.   Lymphadenopathy:      Cervical: No cervical adenopathy.      Upper Body:      Right upper body: No supraclavicular, axillary or pectoral adenopathy.      Left upper body: No supraclavicular, axillary or pectoral adenopathy.   Skin:     General: Skin is warm and dry.   Neurological:      Mental Status: She is alert and oriented to person, place, and time.   Psychiatric:         Thought Content: Thought content normal.          ACE III MINI        Results Review:    None    CMP:  Lab Results   Component Value Date    BUN 11 04/13/2021    CREATININE 0.64 04/13/2021    EGFRIFNONA 97 04/13/2021    BCR 17.2 04/13/2021     04/13/2021    K 4.3 04/13/2021    CO2 26.5 04/13/2021    CALCIUM 9.3 04/13/2021    ALBUMIN 4.40 04/13/2021    BILITOT 0.5 04/13/2021    ALKPHOS 111 04/13/2021    AST 35 (H) 04/13/2021    ALT 42 (H) 04/13/2021     HbA1c:  Lab Results   Component Value Date    HGBA1C 5.80 (H) 04/13/2021    HGBA1C 5.40 01/16/2020     Microalbumin:  Lab Results   Component Value Date    MICROALBUR <1.2 04/13/2021     Lipid Panel  Lab Results   Component Value Date    CHOL 178 04/13/2021    TRIG 119 04/13/2021    TRIG 105 04/13/2021    HDL 47 04/13/2021     (H) 04/13/2021    AST 35 (H) 04/13/2021    ALT 42 (H) 04/13/2021       Medication Review: Medications reviewed and noted  Patient Instructions   Problem List Items Addressed This Visit        Endocrine and Metabolic    Class 2 severe obesity due to excess calories with serious comorbidity and body mass index (BMI) of 37.0 to 37.9 in adult (HCC) (Chronic)    Overview                  Current Assessment & Plan     Daily exercise and low-fat low sugar diet recommended.  Eating smaller portions at mealtime recommended.    Weigh once a week to monitor progress.              Genitourinary and Reproductive     Breast pain in female - Primary  (Chronic)    Relevant Orders    Mammo Diagnostic Digital Tomosynthesis Right With CAD    US Breast Right Complete             Diagnosis Plan   1. Breast pain in female  Mammo Diagnostic Digital Tomosynthesis Right With CAD    US Breast Right Complete   2. Class 2 severe obesity due to excess calories with serious comorbidity and body mass index (BMI) of 37.0 to 37.9 in adult (HCC)       1. Breast pain.   -Diagnostic mammogram and ultrasound ordered.  -Discussed possible diagnoses and the fact that her October mammogram was normal, but did show fibroglandular tissue.  -The patient's breast are dense.  -Advised the patient to decrease caffeine from 5 cups to 1 cup a day.  -Patient may take Tylenol as needed for pain. Moist heat pack may also be helpful.    2. Obesity.  -Advised patient to increase exercise and walking.  -Advised patient to decrease fats and sugars in her diet. Discussed that it can help with weight loss and the risk of heart disease.        Plan of care reviewed with patient at the conclusion of today's visit. Education was provided regarding diagnosis, management, and any prescribed or recommended OTC medications.Patient verbalizes understanding of and agreement with management plan.       Transcribed from ambient dictation for Elyse Escobedo MD by Josseline Gallegos/Alcira Pizano.  03/17/22   08:33 EDT    Patient verbalized consent to the visit recording.  I have personally performed the services described in this document as transcribed by the above individual, and it is both accurate and complete.  Elyse Escobedo MD  3/17/2022  16:24 EDT        Elyse Escobedo MD        Answers for HPI/ROS submitted by the patient on 3/16/2022  Please describe your symptoms.: Breast pain  Have you had these symptoms before?: No  How long have you been having these symptoms?: Greater than 2 weeks  What is the primary reason for your visit?: Other

## 2022-03-17 NOTE — ASSESSMENT & PLAN NOTE
Daily exercise and low-fat low sugar diet recommended.  Eating smaller portions at mealtime recommended.    Weigh once a week to monitor progress.

## 2022-03-17 NOTE — PATIENT INSTRUCTIONS
Patient Instructions   Problem List Items Addressed This Visit          Endocrine and Metabolic    Class 2 severe obesity due to excess calories with serious comorbidity and body mass index (BMI) of 37.0 to 37.9 in adult (HCC) (Chronic)    Overview                  Current Assessment & Plan     Daily exercise and low-fat low sugar diet recommended.  Eating smaller portions at mealtime recommended.    Weigh once a week to monitor progress.              Genitourinary and Reproductive     Breast pain in female - Primary (Chronic)    Relevant Orders    Mammo Diagnostic Digital Tomosynthesis Right With CAD    US Breast Right Complete          BMI for Adults  What is BMI?  Body mass index (BMI) is a number that is calculated from a person's weight and height. BMI can help estimate how much of a person's weight is composed of fat. BMI does not measure body fat directly. Rather, it is an alternative to procedures that directly measure body fat, which can be difficult and expensive.  BMI can help identify people who may be at higher risk for certain medical problems.  What are BMI measurements used for?  BMI is used as a screening tool to identify possible weight problems. It helps determine whether a person is obese, overweight, a healthy weight, or underweight.  BMI is useful for:  Identifying a weight problem that may be related to a medical condition or may increase the risk for medical problems.  Promoting changes, such as changes in diet and exercise, to help reach a healthy weight. BMI screening can be repeated to see if these changes are working.  How is BMI calculated?  BMI involves measuring your weight in relation to your height. Both height and weight are measured, and the BMI is calculated from those numbers. This can be done either in English ([place] or metric measurements. Note that charts and online BMI calculators are available to help you find your BMI quickly and easily without having to do these  "calculations yourself.  To calculate your BMI in English ([place] measurements:    Measure your weight in pounds (lb).  Multiply the number of pounds by 703.  For example, for a person who weighs 180 lb, multiply that number by 703, which equals 126,540.  Measure your height in inches. Then multiply that number by itself to get a measurement called \"inches squared.\"  For example, for a person who is 70 inches tall, the \"inches squared\" measurement is 70 inches x 70 inches, which equals 4,900 inches squared.  Divide the total from step 2 (number of lb x 703) by the total from step 3 (inches squared): 126,540 ÷ 4,900 = 25.8. This is your BMI.    To calculate your BMI in metric measurements:  Measure your weight in kilograms (kg).  Measure your height in meters (m). Then multiply that number by itself to get a measurement called \"meters squared.\"  For example, for a person who is 1.75 m tall, the \"meters squared\" measurement is 1.75 m x 1.75 m, which is equal to 3.1 meters squared.  Divide the number of kilograms (your weight) by the meters squared number. In this example: 70 ÷ 3.1 = 22.6. This is your BMI.  What do the results mean?  BMI charts are used to identify whether you are underweight, normal weight, overweight, or obese. The following guidelines will be used:  Underweight: BMI less than 18.5.  Normal weight: BMI between 18.5 and 24.9.  Overweight: BMI between 25 and 29.9.  Obese: BMI of 30 or above.  Keep these notes in mind:  Weight includes both fat and muscle, so someone with a muscular build, such as an athlete, may have a BMI that is higher than 24.9. In cases like these, BMI is not an accurate measure of body fat.  To determine if excess body fat is the cause of a BMI of 25 or higher, further assessments may need to be done by a health care provider.  BMI is usually interpreted in the same way for men and women.  Where to find more information  For more information about BMI, including tools to quickly " calculate your BMI, go to these websites:  Centers for Disease Control and Prevention: www.cdc.gov  American Heart Association: www.heart.org  National Heart, Lung, and Blood [place]: www.nhlbi.nih.gov  Summary  Body mass index (BMI) is a number that is calculated from a person's weight and height.  BMI may help estimate how much of a person's weight is composed of fat. BMI can help identify those who may be at higher risk for certain medical problems.  BMI can be measured using English measurements or metric measurements.  BMI charts are used to identify whether you are underweight, normal weight, overweight, or obese.  This information is not intended to replace advice given to you by your health care provider. Make sure you discuss any questions you have with your health care provider.  Document Revised: 09/09/2020 Document Reviewed: 07/17/2020  [email or web address] Patient Education © 2021 [employer]  Calorie Counting for Weight Loss  Calories are units of energy. Your body needs a certain number of calories from food to keep going throughout the day. When you eat or drink more calories than your body needs, your body stores the extra calories mostly as fat. When you eat or drink fewer calories than your body needs, your body burns fat to get the energy it needs.  Calorie counting means keeping track of how many calories you eat and drink each day. Calorie counting can be helpful if you need to lose weight. If you eat fewer calories than your body needs, you should lose weight. Ask your health care provider what a healthy weight is for you.  For calorie counting to work, you will need to eat the right number of calories each day to lose a healthy amount of weight per week. A dietitian can help you figure out how many calories you need in a day and will suggest ways to reach your calorie goal.  A healthy amount of weight to lose each week is usually 1-2 lb (0.5-0.9 kg). This usually means that your daily calorie intake  should be reduced by 500-750 calories.  Eating 1,200-1,500 calories a day can help most women lose weight.  Eating 1,500-1,800 calories a day can help most men lose weight.  What do I need to know about calorie counting?  Work with your health care provider or dietitian to determine how many calories you should get each day. To meet your daily calorie goal, you will need to:  Find out how many calories are in each food that you would like to eat. Try to do this before you eat.  Decide how much of the food you plan to eat.  Keep a food log. Do this by writing down what you ate and how many calories it had.  To successfully lose weight, it is important to balance calorie counting with a healthy lifestyle that includes regular activity.  Where do I find calorie information?  The number of calories in a food can be found on a Nutrition Facts label. If a food does not have a Nutrition Facts label, try to look up the calories online or ask your dietitian for help.  Remember that calories are listed per serving. If you choose to have more than one serving of a food, you will have to multiply the calories per serving by the number of servings you plan to eat. For example, the label on a package of bread might say that a serving size is 1 slice and that there are 90 calories in a serving. If you eat 1 slice, you will have eaten 90 calories. If you eat 2 slices, you will have eaten 180 calories.    How do I keep a food log?  After each time that you eat, record the following in your food log as soon as possible:  What you ate. Be sure to include toppings, sauces, and other extras on the food.  How much you ate. This can be measured in cups, ounces, or number of items.  How many calories were in each food and drink.  The total number of calories in the food you ate.  Keep your food log near you, such as in a pocket-sized notebook or on an chela or website on your mobile phone. Some programs will calculate calories for you and  show you how many calories you have left to meet your daily goal.  What are some portion-control tips?  Know how many calories are in a serving. This will help you know how many servings you can have of a certain food.  Use a measuring cup to measure serving sizes. You could also try weighing out portions on a kitchen scale. With time, you will be able to estimate serving sizes for some foods.  Take time to put servings of different foods on your favorite plates or in your favorite bowls and cups so you know what a serving looks like.  Try not to eat straight from a food's packaging, such as from a bag or box. Eating straight from the package makes it hard to see how much you are eating and can lead to overeating. Put the amount you would like to eat in a cup or on a plate to make sure you are eating the right portion.  Use smaller plates, glasses, and bowls for smaller portions and to prevent overeating.  Try not to multitask. For example, avoid watching TV or using your computer while eating. If it is time to eat, sit down at a table and enjoy your food. This will help you recognize when you are full. It will also help you be more mindful of what and how much you are eating.  What are tips for following this plan?  Reading food labels  Check the calorie count compared with the serving size. The serving size may be smaller than what you are used to eating.  Check the source of the calories. Try to choose foods that are high in protein, fiber, and vitamins, and low in saturated fat, trans fat, and sodium.  Shopping  Read nutrition labels while you shop. This will help you make healthy decisions about which foods to buy.  Pay attention to nutrition labels for low-fat or fat-free foods. These foods sometimes have the same number of calories or more calories than the full-fat versions. They also often have added sugar, starch, or salt to make up for flavor that was removed with the fat.  Make a grocery list of  lower-calorie foods and stick to it.  Cooking  Try to cook your favorite foods in a healthier way. For example, try baking instead of frying.  Use low-fat dairy products.  Meal planning  Use more fruits and vegetables. One-half of your plate should be fruits and vegetables.  Include lean proteins, such as chicken, turkey, and fish.  Lifestyle  Each week, aim to do one of the followin minutes of moderate exercise, such as walking.  75 minutes of vigorous exercise, such as running.  General information  Know how many calories are in the foods you eat most often. This will help you calculate calorie counts faster.  Find a way of tracking calories that works for you. Get creative. Try different apps or programs if writing down calories does not work for you.  What foods should I eat?  Eat nutritious foods. It is better to have a nutritious, high-calorie food, such as an avocado, than a food with few nutrients, such as a bag of potato chips.  Use your calories on foods and drinks that will fill you up and will not leave you hungry soon after eating.  Examples of foods that fill you up are nuts and nut butters, vegetables, lean proteins, and high-fiber foods such as whole grains. High-fiber foods are foods with more than 5 g of fiber per serving.  Pay attention to calories in drinks. Low-calorie drinks include water and unsweetened drinks.  The items listed above may not be a complete list of foods and beverages you can eat. Contact a dietitian for more information.    What foods should I limit?  Limit foods or drinks that are not good sources of vitamins, minerals, or protein or that are high in unhealthy fats. These include:  Candy.  Other sweets.  Sodas, specialty coffee drinks, alcohol, and juice.  The items listed above may not be a complete list of foods and beverages you should avoid. Contact a dietitian for more information.  How do I count calories when eating out?  Pay attention to portions. Often,  portions are much larger when eating out. Try these tips to keep portions smaller:  Consider sharing a meal instead of getting your own.  If you get your own meal, eat only half of it. Before you start eating, ask for a container and put half of your meal into it.  When available, consider ordering smaller portions from the menu instead of full portions.  Pay attention to your food and drink choices. Knowing the way food is cooked and what is included with the meal can help you eat fewer calories.  If calories are listed on the menu, choose the lower-calorie options.  Choose dishes that include vegetables, fruits, whole grains, low-fat dairy products, and lean proteins.  Choose items that are boiled, broiled, grilled, or steamed. Avoid items that are buttered, battered, fried, or served with cream sauce. Items labeled as crispy are usually fried, unless stated otherwise.  Choose water, low-fat milk, unsweetened iced tea, or other drinks without added sugar. If you want an alcoholic beverage, choose a lower-calorie option, such as a glass of wine or light beer.  Ask for dressings, sauces, and syrups on the side. These are usually high in calories, so you should limit the amount you eat.  If you want a salad, choose a garden salad and ask for grilled meats. Avoid extra toppings such as bajwa, cheese, or fried items. Ask for the dressing on the side, or ask for olive oil and vinegar or lemon to use as dressing.  Estimate how many servings of a food you are given. Knowing serving sizes will help you be aware of how much food you are eating at restaurants.  Where to find more information  Centers for Disease Control and Prevention: www.cdc.gov  [place] Department of Agriculture: myplate.gov  Summary  Calorie counting means keeping track of how many calories you eat and drink each day. If you eat fewer calories than your body needs, you should lose weight.  A healthy amount of weight to lose per week is usually 1-2 lb  (0.5-0.9 kg). This usually means reducing your daily calorie intake by 500-750 calories.  The number of calories in a food can be found on a Nutrition Facts label. If a food does not have a Nutrition Facts label, try to look up the calories online or ask your dietitian for help.  Use smaller plates, glasses, and bowls for smaller portions and to prevent overeating.  Use your calories on foods and drinks that will fill you up and not leave you hungry shortly after a meal.  This information is not intended to replace advice given to you by your health care provider. Make sure you discuss any questions you have with your health care provider.  Document Revised: 01/28/2021 Document Reviewed: 01/28/2021  [employer]vier Patient Education © 2021 [employer]

## 2022-06-24 RX ORDER — ESOMEPRAZOLE MAGNESIUM 40 MG/1
CAPSULE, DELAYED RELEASE ORAL
Qty: 90 CAPSULE | Refills: 1 | Status: SHIPPED | OUTPATIENT
Start: 2022-06-24 | End: 2023-01-23

## 2022-06-24 RX ORDER — VALACYCLOVIR HYDROCHLORIDE 1 G/1
1000 TABLET, FILM COATED ORAL 2 TIMES DAILY PRN
Qty: 30 TABLET | Refills: 5 | Status: SHIPPED | OUTPATIENT
Start: 2022-06-24

## 2022-06-24 NOTE — TELEPHONE ENCOUNTER
Rx Refill Note  Requested Prescriptions     Pending Prescriptions Disp Refills   • valACYclovir (VALTREX) 1000 MG tablet [Pharmacy Med Name: VALACYCLOVIR HCL 1 GRAM TABLET] 30 tablet 5     Sig: TAKE 1 TABLET BY MOUTH 2 (TWO) TIMES A DAY AS NEEDED (FEVER BLISTER).   • esomeprazole (nexIUM) 40 MG capsule [Pharmacy Med Name: ESOMEPRAZOLE MAG DR 40 MG CAP] 90 capsule 1     Sig: TAKE 1 CAPSULE BY MOUTH EVERY DAY IN THE MORNING BEFORE BREAKFAST      Last office visit with prescribing clinician: 3/16/2022      Next office visit with prescribing clinician: Visit date not found            Nae Laughlin MA  06/24/22, 12:00 EDT

## 2023-01-23 RX ORDER — ESOMEPRAZOLE MAGNESIUM 40 MG/1
CAPSULE, DELAYED RELEASE ORAL
Qty: 90 CAPSULE | Refills: 1 | Status: SHIPPED | OUTPATIENT
Start: 2023-01-23

## 2023-01-23 NOTE — TELEPHONE ENCOUNTER
Rx Refill Note  Requested Prescriptions     Pending Prescriptions Disp Refills   • esomeprazole (nexIUM) 40 MG capsule [Pharmacy Med Name: ESOMEPRAZOLE MAG DR 40 MG CAP] 90 capsule 1     Sig: TAKE 1 CAPSULE BY MOUTH EVERY DAY IN THE MORNING BEFORE BREAKFAST      Last office visit with prescribing clinician: 3/16/2022   Last telemedicine visit with prescribing clinician: Visit date not found   Next office visit with prescribing clinician: Visit date not found                         Would you like a call back once the refill request has been completed: [] Yes [] No    If the office needs to give you a call back, can they leave a voicemail: [] Yes [] No    Dari Mariscal LPN  01/23/23, 09:22 EST

## 2023-01-24 NOTE — TELEPHONE ENCOUNTER
Called patient and informed her that Dr. Escobedo stated she was overdue for a physical.  Patient stated that she was driving and would call back tomorrow and get that scheduled.

## 2023-01-25 ENCOUNTER — TELEPHONE (OUTPATIENT)
Dept: INTERNAL MEDICINE | Facility: CLINIC | Age: 58
End: 2023-01-25
Payer: MEDICAID

## 2023-01-25 NOTE — TELEPHONE ENCOUNTER
Called pt; she advised she does not need assistance with anything further.    I called because there was an open encounter with no notes on file

## 2023-07-12 PROBLEM — R20.0 NUMBNESS OF RIGHT HAND: Chronic | Status: ACTIVE | Noted: 2023-07-12

## 2023-07-12 PROBLEM — R00.0 RAPID HEART BEAT: Chronic | Status: ACTIVE | Noted: 2023-07-12

## 2023-07-12 PROBLEM — F41.8 SITUATIONAL ANXIETY: Chronic | Status: ACTIVE | Noted: 2023-07-12

## 2023-07-12 PROBLEM — H02.401 PTOSIS OF RIGHT EYELID: Chronic | Status: ACTIVE | Noted: 2023-07-12

## 2023-11-27 ENCOUNTER — TELEPHONE (OUTPATIENT)
Dept: GASTROENTEROLOGY | Facility: CLINIC | Age: 58
End: 2023-11-27
Payer: MEDICAID

## 2023-11-27 RX ORDER — SODIUM PICOSULFATE, MAGNESIUM OXIDE, AND ANHYDROUS CITRIC ACID 12; 3.5; 1 G/175ML; G/175ML; MG/175ML
350 LIQUID ORAL ONCE
Qty: 350 ML | Refills: 0 | Status: SHIPPED | OUTPATIENT
Start: 2023-11-27 | End: 2023-11-27

## 2023-11-27 NOTE — TELEPHONE ENCOUNTER
Hub staff attempted to follow warm transfer process and was unsuccessful     Caller: Carla Camarillo    Relationship to patient: Self    Best call back number: [phone]    Patient is needing: PT IS CALLING TO CANCEL PROCEDURE ON 12/05/2023. PLEASE CANCEL. PT WILL CALL BACK TO RESCHEDULE. IF NEEDING TO REACH OUT TO PATIENT AND CAN'T REACH PT. IT IS OKAY TO LEAVE AN VOICEMAIL.

## 2024-01-08 RX ORDER — ESOMEPRAZOLE MAGNESIUM 40 MG/1
40 CAPSULE, DELAYED RELEASE ORAL
Qty: 90 CAPSULE | Refills: 1 | Status: SHIPPED | OUTPATIENT
Start: 2024-01-08

## 2024-04-03 PROBLEM — E53.1 VITAMIN B6 DEFICIENCY: Chronic | Status: ACTIVE | Noted: 2024-04-03

## 2024-07-05 RX ORDER — ESOMEPRAZOLE MAGNESIUM 40 MG/1
40 CAPSULE, DELAYED RELEASE ORAL
Qty: 90 CAPSULE | Refills: 1 | Status: SHIPPED | OUTPATIENT
Start: 2024-07-05

## 2024-12-11 ENCOUNTER — TELEPHONE (OUTPATIENT)
Dept: INTERNAL MEDICINE | Facility: CLINIC | Age: 59
End: 2024-12-11
Payer: MEDICAID

## 2024-12-11 RX ORDER — VALACYCLOVIR HYDROCHLORIDE 1 G/1
1000 TABLET, FILM COATED ORAL 2 TIMES DAILY PRN
Qty: 30 TABLET | Refills: 5 | Status: SHIPPED | OUTPATIENT
Start: 2024-12-11

## 2024-12-11 NOTE — TELEPHONE ENCOUNTER
Rx Refill Note  Requested Prescriptions     Pending Prescriptions Disp Refills    valACYclovir (VALTREX) 1000 MG tablet 30 tablet 5     Sig: Take 1 tablet by mouth 2 (Two) Times a Day As Needed (fever blister).      Last office visit with prescribing clinician: 7/12/2023   Last telemedicine visit with prescribing clinician: Visit date not found   Next office visit with prescribing clinician: Visit date not found                         Would you like a call back once the refill request has been completed: [] Yes [] No    If the office needs to give you a call back, can they leave a voicemail: [] Yes [] No    Dorene Barnes MA  12/11/24, 10:37 EST

## 2025-01-07 RX ORDER — ESOMEPRAZOLE MAGNESIUM 40 MG/1
40 CAPSULE, DELAYED RELEASE ORAL
Qty: 30 CAPSULE | Refills: 0 | Status: SHIPPED | OUTPATIENT
Start: 2025-01-07